# Patient Record
Sex: MALE | Race: WHITE | NOT HISPANIC OR LATINO | URBAN - METROPOLITAN AREA
[De-identification: names, ages, dates, MRNs, and addresses within clinical notes are randomized per-mention and may not be internally consistent; named-entity substitution may affect disease eponyms.]

---

## 2024-05-22 ENCOUNTER — NURSING HOME VISIT (OUTPATIENT)
Dept: POST ACUTE CARE | Facility: EXTERNAL LOCATION | Age: 29
End: 2024-05-22
Payer: MEDICARE

## 2024-05-22 VITALS
WEIGHT: 315 LBS | BODY MASS INDEX: 42.66 KG/M2 | HEART RATE: 90 BPM | SYSTOLIC BLOOD PRESSURE: 106 MMHG | TEMPERATURE: 97.3 F | OXYGEN SATURATION: 97 % | DIASTOLIC BLOOD PRESSURE: 70 MMHG | RESPIRATION RATE: 18 BRPM | HEIGHT: 72 IN

## 2024-05-22 DIAGNOSIS — J30.2 SEASONAL ALLERGIES: ICD-10-CM

## 2024-05-22 DIAGNOSIS — K21.9 GASTROESOPHAGEAL REFLUX DISEASE WITHOUT ESOPHAGITIS: ICD-10-CM

## 2024-05-22 DIAGNOSIS — F51.01 PRIMARY INSOMNIA: ICD-10-CM

## 2024-05-22 DIAGNOSIS — F84.0 AUTISM (HHS-HCC): ICD-10-CM

## 2024-05-22 DIAGNOSIS — E78.2 MIXED HYPERLIPIDEMIA: Primary | ICD-10-CM

## 2024-05-22 DIAGNOSIS — K59.04 CHRONIC IDIOPATHIC CONSTIPATION: ICD-10-CM

## 2024-05-22 DIAGNOSIS — Z11.4 SCREENING FOR HIV (HUMAN IMMUNODEFICIENCY VIRUS): ICD-10-CM

## 2024-05-22 DIAGNOSIS — G40.909 NONINTRACTABLE EPILEPSY WITHOUT STATUS EPILEPTICUS, UNSPECIFIED EPILEPSY TYPE (MULTI): ICD-10-CM

## 2024-05-22 DIAGNOSIS — Z11.59 ENCOUNTER FOR HEPATITIS C SCREENING TEST FOR LOW RISK PATIENT: ICD-10-CM

## 2024-05-22 DIAGNOSIS — E55.9 VITAMIN D DEFICIENCY: ICD-10-CM

## 2024-05-22 PROBLEM — K59.00 CONSTIPATION: Status: ACTIVE | Noted: 2024-05-22

## 2024-05-22 PROBLEM — H91.92 DECREASED HEARING OF LEFT EAR: Status: ACTIVE | Noted: 2024-05-22

## 2024-05-22 PROBLEM — R44.0 AUDITORY HALLUCINATION: Status: ACTIVE | Noted: 2024-05-22

## 2024-05-22 PROBLEM — G47.00 INSOMNIA: Status: ACTIVE | Noted: 2024-05-22

## 2024-05-22 LAB
25(OH)D3 SERPL-MCNC: 23 NG/ML (ref 31–100)
ALBUMIN SERPL-MCNC: 4.9 G/DL (ref 3.5–5)
ALP BLD-CCNC: 89 U/L (ref 35–125)
ALT SERPL-CCNC: 47 U/L (ref 5–40)
ANION GAP SERPL CALC-SCNC: 13 MMOL/L
AST SERPL-CCNC: 23 U/L (ref 5–40)
BILIRUB SERPL-MCNC: 0.4 MG/DL (ref 0.1–1.2)
BUN SERPL-MCNC: 14 MG/DL (ref 8–25)
CALCIUM SERPL-MCNC: 9.8 MG/DL (ref 8.5–10.4)
CHLORIDE SERPL-SCNC: 106 MMOL/L (ref 97–107)
CHOLEST SERPL-MCNC: 156 MG/DL (ref 133–200)
CHOLEST/HDLC SERPL: 4.1 {RATIO}
CO2 SERPL-SCNC: 19 MMOL/L (ref 24–31)
CREAT SERPL-MCNC: 1.2 MG/DL (ref 0.4–1.6)
EGFRCR SERPLBLD CKD-EPI 2021: 84 ML/MIN/1.73M*2
ERYTHROCYTE [DISTWIDTH] IN BLOOD BY AUTOMATED COUNT: 12.1 % (ref 11.5–14.5)
GLUCOSE SERPL-MCNC: 104 MG/DL (ref 65–99)
HCT VFR BLD AUTO: 46.7 % (ref 41–52)
HDLC SERPL-MCNC: 38 MG/DL
HGB BLD-MCNC: 15.9 G/DL (ref 13.5–17.5)
LDLC SERPL CALC-MCNC: 92 MG/DL (ref 65–130)
MCH RBC QN AUTO: 30.2 PG (ref 26–34)
MCHC RBC AUTO-ENTMCNC: 34 G/DL (ref 32–36)
MCV RBC AUTO: 89 FL (ref 80–100)
NRBC BLD-RTO: 0 /100 WBCS (ref 0–0)
PLATELET # BLD AUTO: 238 X10*3/UL (ref 150–450)
POTASSIUM SERPL-SCNC: 4.5 MMOL/L (ref 3.4–5.1)
PROT SERPL-MCNC: 7.6 G/DL (ref 5.9–7.9)
RBC # BLD AUTO: 5.26 X10*6/UL (ref 4.5–5.9)
SODIUM SERPL-SCNC: 138 MMOL/L (ref 133–145)
TRIGL SERPL-MCNC: 132 MG/DL (ref 40–150)
WBC # BLD AUTO: 5.8 X10*3/UL (ref 4.4–11.3)

## 2024-05-22 PROCEDURE — 36415 COLL VENOUS BLD VENIPUNCTURE: CPT

## 2024-05-22 PROCEDURE — 80061 LIPID PANEL: CPT

## 2024-05-22 PROCEDURE — 36415 COLL VENOUS BLD VENIPUNCTURE: CPT | Performed by: NURSE PRACTITIONER

## 2024-05-22 PROCEDURE — 85027 COMPLETE CBC AUTOMATED: CPT

## 2024-05-22 PROCEDURE — 82306 VITAMIN D 25 HYDROXY: CPT

## 2024-05-22 PROCEDURE — 80053 COMPREHEN METABOLIC PANEL: CPT

## 2024-05-22 PROCEDURE — 86803 HEPATITIS C AB TEST: CPT

## 2024-05-22 PROCEDURE — 99349 HOME/RES VST EST MOD MDM 40: CPT | Performed by: NURSE PRACTITIONER

## 2024-05-22 PROCEDURE — 87389 HIV-1 AG W/HIV-1&-2 AB AG IA: CPT

## 2024-05-22 RX ORDER — FLUOXETINE HYDROCHLORIDE 20 MG/1
60 CAPSULE ORAL DAILY
COMMUNITY
Start: 2024-05-15

## 2024-05-22 RX ORDER — DOCUSATE SODIUM 100 MG/1
100 CAPSULE ORAL 2 TIMES DAILY
COMMUNITY
Start: 2013-11-18

## 2024-05-22 RX ORDER — ZONISAMIDE 100 MG/1
300 CAPSULE ORAL 2 TIMES DAILY
COMMUNITY
Start: 2013-11-18

## 2024-05-22 RX ORDER — BLACK COHOSH ROOT 200 MG
1000 CAPSULE ORAL DAILY
COMMUNITY
Start: 2024-05-15

## 2024-05-22 RX ORDER — IBUPROFEN 600 MG/1
600 TABLET ORAL EVERY 6 HOURS PRN
COMMUNITY

## 2024-05-22 RX ORDER — TRAZODONE HYDROCHLORIDE 100 MG/1
100 TABLET ORAL NIGHTLY
COMMUNITY
Start: 2020-06-24

## 2024-05-22 RX ORDER — OMEPRAZOLE 40 MG/1
40 CAPSULE, DELAYED RELEASE ORAL DAILY
COMMUNITY
Start: 2024-05-15

## 2024-05-22 RX ORDER — CHOLECALCIFEROL (VITAMIN D3) 10 MCG
2 TABLET ORAL DAILY
COMMUNITY
Start: 2024-05-15

## 2024-05-22 RX ORDER — CETIRIZINE HCL 1 MG/ML
1 SOLUTION, ORAL ORAL DAILY
COMMUNITY
Start: 2020-02-21

## 2024-05-22 RX ORDER — EPINEPHRINE 0.3 MG/.3ML
0.3 INJECTION SUBCUTANEOUS ONCE AS NEEDED
COMMUNITY
Start: 2020-02-21

## 2024-05-22 ASSESSMENT — ENCOUNTER SYMPTOMS
LIGHT-HEADEDNESS: 0
CHILLS: 0
NERVOUS/ANXIOUS: 1
HEMATURIA: 0
PALPITATIONS: 0
COUGH: 0
SHORTNESS OF BREATH: 0
SLEEP DISTURBANCE: 1
ABDOMINAL PAIN: 0
TROUBLE SWALLOWING: 0
FEVER: 0
WHEEZING: 0
NAUSEA: 0
DIZZINESS: 0
DIARRHEA: 1
DYSURIA: 0
APPETITE CHANGE: 0
CONSTIPATION: 0
VOMITING: 0

## 2024-05-22 ASSESSMENT — PAIN SCALES - GENERAL: PAINLEVEL: 0-NO PAIN

## 2024-05-22 NOTE — PROGRESS NOTES
Subjective   Patient ID: Daniel Laughlin is a 28 y.o. male who presents for Establish Care (New patient to [facility]).    Visit for 27 y/o male seen today at [facility] to [facility] care. Patient was initially at [facility] upon provider arrival. He walked to the store from his group home. Staff reports that patient has adjusted well to his new facility. He was previously living on his own with caregivers but tells me that he was unable to find a reliable roommate and was no longer able to afford his own home so he decided to move into a group setting. He is alert, able to answer most questions regarding his health but does respond to some questions with one or two word answers or slow responses. His PCP is Dr. César corona and he was last seen in the office on 3/11/24. He received an order to have lab work done but has been unable to get the labs since his appointment. Patient denies any appetite changes or signs of weight loss. He denies abdominal pain but does endorse some intermittent indigestion depending on what he eats. He denies nausea, vomiting, bowel or bladder concerns. He endorses difficulty sleeping and chronic anxiety. He has history of seizures but denies any recent seizure activity. He is ambulatory without assistive device. He denies recent fall or injury. Denies any acute concerns today.          Current Outpatient Medications:     Colace 100 mg capsule, Take 1 capsule (100 mg) by mouth 2 times a day., Disp: , Rfl:     EPINEPHrine 0.3 mg/0.3 mL injection syringe, Inject 0.3 mL (0.3 mg) into the muscle 1 time if needed for anaphylaxis. As Directed, Disp: , Rfl:     FLUoxetine (PROzac) 20 mg capsule, Take 3 capsules (60 mg) by mouth once daily., Disp: , Rfl:     omeprazole (PriLOSEC) 40 mg DR capsule, Take 1 capsule (40 mg) by mouth once daily., Disp: , Rfl:     traZODone (Desyrel) 100 mg tablet, Take 1 tablet (100 mg) by mouth once daily at bedtime., Disp: ,  Rfl:     Vitamin C 500 mg tablet, Take 2 tablets (1,000 mg) by mouth once daily., Disp: , Rfl:     Vitamin D3 10 mcg (400 unit) tablet, Take 2 tablets (20 mcg) by mouth once daily., Disp: , Rfl:     zonisamide (Zonegran) 100 mg capsule, Take 3 capsules (300 mg) by mouth twice a day., Disp: , Rfl:     ZyrTEC 10 mg tablet, Take 1 tablet (10 mg) by mouth once daily., Disp: , Rfl:     ibuprofen 600 mg tablet, Take 1 tablet (600 mg) by mouth every 6 hours if needed for mild pain (1 - 3)., Disp: , Rfl:      Review of Systems   Constitutional:  Negative for appetite change, chills and fever.   HENT:  Positive for hearing loss. Negative for trouble swallowing.    Respiratory:  Negative for cough, shortness of breath and wheezing.    Cardiovascular:  Negative for chest pain and palpitations.   Gastrointestinal:  Positive for diarrhea. Negative for abdominal pain, constipation, nausea and vomiting.        Positive for intermittent indigestion   Genitourinary:  Negative for dysuria and hematuria.   Musculoskeletal:  Negative for gait problem.   Neurological:  Negative for dizziness and light-headedness.   Psychiatric/Behavioral:  Positive for sleep disturbance. The patient is nervous/anxious.      Objective   /70 (BP Location: Left arm, Patient Position: Sitting, BP Cuff Size: Adult)   Pulse 90   Temp 36.3 °C (97.3 °F) (Temporal)   Resp 18   Ht 1.829 m (6')   Wt 143 kg (316 lb)   SpO2 97%   BMI 42.86 kg/m²     Physical Exam  Constitutional:       General: He is not in acute distress.     Appearance: He is obese.   HENT:      Head: Normocephalic and atraumatic.      Nose: Nose normal.      Mouth/Throat:      Mouth: Mucous membranes are moist.      Pharynx: Oropharynx is clear.   Eyes:      Pupils: Pupils are equal, round, and reactive to light.   Cardiovascular:      Rate and Rhythm: Normal rate and regular rhythm.      Heart sounds: No murmur heard.     No friction rub. No gallop.   Pulmonary:      Effort:  Pulmonary effort is normal. No respiratory distress.      Breath sounds: Normal breath sounds. No wheezing, rhonchi or rales.   Abdominal:      General: Bowel sounds are normal. There is no distension.      Palpations: Abdomen is soft.      Tenderness: There is no abdominal tenderness.   Musculoskeletal:         General: Normal range of motion.      Cervical back: Neck supple.      Right lower leg: No edema.      Left lower leg: No edema.   Skin:     General: Skin is warm and dry.   Neurological:      General: No focal deficit present.      Mental Status: He is alert and oriented to person, place, and time.   Psychiatric:         Mood and Affect: Mood normal.         Behavior: Behavior normal.     Assessment/Plan   Diagnoses and all orders for this visit:  Mixed hyperlipidemia  -     Lipid panel; Future    Gastroesophageal reflux disease without esophagitis  -     CBC; Future  -     Comprehensive metabolic panel; Future  -chronic, intermittent  -continue omeprazole     Vitamin D deficiency  -     Vitamin D 25-Hydroxy,Total (for eval of Vitamin D levels); Future  -chronic, stable  -continue vitamin d/vitamin c supplement    Chronic idiopathic constipation  -chronic, intermittent  -continue routine colace     Nonintractable epilepsy without status epilepticus, unspecified epilepsy type (Multi)  -chronic, stable  -no recent seizure activity reported  -continue Zonisamide     Seasonal allergies  -chronic, managed with [facility]     Encounter for hepatitis C screening test for low risk patient  -     Hepatitis C antibody; Future    Screening for HIV (human immunodeficiency virus)  -     HIV 1/2 Antigen/Antibody Screen with Reflex to Confirmation; Future    Primary insomnia  -chronic, intermittent  -continue Trazodone    Autism ([facility])  -chronic, continue safety measures and supportive care    Routine labs obtained in home- CBC, CMP, Vitamin D, Hepatitis C, HIV, Lipid panel- pt tolerated well        Adenike Abad,  APRN-CNP

## 2024-05-23 LAB
HCV AB SER QL: NONREACTIVE
HIV 1+2 AB+HIV1 P24 AG SERPL QL IA: NONREACTIVE

## 2024-06-12 ENCOUNTER — TELEPHONE (OUTPATIENT)
Dept: PRIMARY CARE | Facility: CLINIC | Age: 29
End: 2024-06-12
Payer: MEDICARE

## 2024-06-12 NOTE — TELEPHONE ENCOUNTER
Patient will need paperwork completed to attend workshop.  Fax number given to Elisabeth who states she will fax documents to the office for provider completion.

## 2024-07-03 ENCOUNTER — TELEPHONE (OUTPATIENT)
Dept: PRIMARY CARE | Facility: CLINIC | Age: 29
End: 2024-07-03
Payer: MEDICARE

## 2024-07-03 NOTE — TELEPHONE ENCOUNTER
7/5/24 House Calls visit with Adenike Abad NP confirmed via phone with Elisabeth/ [facility] .

## 2024-07-05 ENCOUNTER — NURSING HOME VISIT (OUTPATIENT)
Dept: POST ACUTE CARE | Facility: EXTERNAL LOCATION | Age: 29
End: 2024-07-05
Payer: MEDICARE

## 2024-07-05 VITALS
HEART RATE: 83 BPM | RESPIRATION RATE: 18 BRPM | TEMPERATURE: 96.9 F | OXYGEN SATURATION: 97 % | HEIGHT: 72 IN | SYSTOLIC BLOOD PRESSURE: 110 MMHG | DIASTOLIC BLOOD PRESSURE: 70 MMHG | WEIGHT: 315 LBS | BODY MASS INDEX: 42.66 KG/M2

## 2024-07-05 DIAGNOSIS — K59.04 CHRONIC IDIOPATHIC CONSTIPATION: ICD-10-CM

## 2024-07-05 DIAGNOSIS — F84.0 AUTISM (HHS-HCC): Primary | ICD-10-CM

## 2024-07-05 DIAGNOSIS — G40.909 NONINTRACTABLE EPILEPSY WITHOUT STATUS EPILEPTICUS, UNSPECIFIED EPILEPSY TYPE (MULTI): ICD-10-CM

## 2024-07-05 DIAGNOSIS — F51.05 INSOMNIA DUE TO OTHER MENTAL DISORDER: ICD-10-CM

## 2024-07-05 DIAGNOSIS — K21.9 GASTROESOPHAGEAL REFLUX DISEASE WITHOUT ESOPHAGITIS: ICD-10-CM

## 2024-07-05 DIAGNOSIS — F99 INSOMNIA DUE TO OTHER MENTAL DISORDER: ICD-10-CM

## 2024-07-05 DIAGNOSIS — E55.9 VITAMIN D DEFICIENCY: ICD-10-CM

## 2024-07-05 ASSESSMENT — PAIN SCALES - GENERAL: PAINLEVEL: 0-NO PAIN

## 2024-07-05 NOTE — PROGRESS NOTES
Subjective   Patient ID: Daniel Laughlin is a 28 y.o. male who presents for Follow-up (Routine follow up, chronic medical conditions).    Visit for 29 y/o male seen today at [facility] for routine follow up of chronic medical conditions. PMHx of Autism, Epilepsy, GERD, Hyperlipidemia, Vitamin D deficiency, seasonal allergies, decreased hearing of left ear, constipation, hallucinations, insomnia. Pt is standing in the kitchen this morning. He is alert, able to answer most questions regarding his health but does respond to some questions with one or two word answers or slow responses. The HPI is supplemented by facility staff and patients medical chart. Facility staff reports that patient is adjusting to new facility well. He is going to day program M-F now and is doing well. No behavioral concerns reported. Pt denies appetite changes or signs of weight loss. Denies abdominal pain, indigestion, nausea, vomiting. Denies bowel or bladder concerns. He has history of epilepsy. No recent seizure activity reported. Pt is ambulatory. Does not use assistive device. He denies recent fall or injury. He is able to walk to the local [employer] but reports that he has not been going as often as it has been hot outside and he does not have a phone to use when leaving the facility.          Current Outpatient Medications:     Colace 100 mg capsule, Take 1 capsule (100 mg) by mouth 2 times a day., Disp: , Rfl:     EPINEPHrine 0.3 mg/0.3 mL injection syringe, Inject 0.3 mL (0.3 mg) into the muscle 1 time if needed for anaphylaxis. As Directed, Disp: , Rfl:     FLUoxetine (PROzac) 20 mg capsule, Take 3 capsules (60 mg) by mouth once daily., Disp: , Rfl:     ibuprofen 600 mg tablet, Take 1 tablet (600 mg) by mouth every 6 hours if needed for mild pain (1 - 3)., Disp: , Rfl:     omeprazole (PriLOSEC) 40 mg DR capsule, Take 1 capsule (40 mg) by mouth once daily., Disp: , Rfl:     traZODone (Desyrel) 100 mg  tablet, Take 1 tablet (100 mg) by mouth once daily at bedtime., Disp: , Rfl:     Vitamin C 500 mg tablet, Take 2 tablets (1,000 mg) by mouth once daily., Disp: , Rfl:     Vitamin D3 10 mcg (400 unit) tablet, Take 2 tablets (20 mcg) by mouth once daily., Disp: , Rfl:     zonisamide (Zonegran) 100 mg capsule, Take 3 capsules (300 mg) by mouth twice a day., Disp: , Rfl:     ZyrTEC 10 mg tablet, Take 1 tablet (10 mg) by mouth once daily., Disp: , Rfl:      Review of Systems  Constitutional:  Negative for appetite change, chills and fever.   HENT:  Positive for hearing loss. Negative for trouble swallowing.    Respiratory:  Negative for cough, shortness of breath and wheezing.    Cardiovascular:  Negative for chest pain and palpitations.   Gastrointestinal: Negative for abdominal pain, constipation, nausea and vomiting.   Genitourinary:  Negative for dysuria and hematuria.   Musculoskeletal:  Negative for gait problem.   Neurological:  Negative for dizziness and light-headedness.   Psychiatric/Behavioral:  Positive for anxiety, difficulty.    Objective   /70 (BP Location: Left arm, Patient Position: Standing, BP Cuff Size: Adult)   Pulse 83   Temp 36.1 °C (96.9 °F) (Temporal)   Resp 18   Ht 1.829 m (6')   Wt 143 kg (315 lb)   SpO2 97%   BMI 42.72 kg/m²     Physical Exam  Constitutional:       General: He is not in acute distress.     Appearance: Alert. He is obese.   HENT:      Head: Normocephalic and atraumatic.      Nose: Nose normal.      Mouth/Throat:      Mouth: Mucous membranes are moist.      Pharynx: Oropharynx is clear.   Eyes:      Pupils: Pupils are equal, round, and reactive to light.   Cardiovascular:      Rate and Rhythm: Normal rate and regular rhythm.      Heart sounds: No murmur heard.     No friction rub. No gallop.   Pulmonary:      Effort: Pulmonary effort is normal. No respiratory distress.      Breath sounds: Normal breath sounds. No wheezing, rhonchi or rales.   Abdominal:       "General: Bowel sounds are normal. There is no distension.      Palpations: Abdomen is soft.      Tenderness: There is no abdominal tenderness.   Musculoskeletal:         General: Normal range of motion.      Cervical back: Neck supple.      Right lower leg: No edema.      Left lower leg: No edema.   Skin:     General: Skin is warm and dry.   Neurological:      General: No focal deficit present.      Mental Status: He is alert and oriented to person, place, and time.   Psychiatric:         Mood and Affect: Mood normal.         Behavior: Behavior normal.     Lab Results   Component Value Date    WBC 5.8 05/22/2024    HGB 15.9 05/22/2024    HCT 46.7 05/22/2024    MCV 89 05/22/2024     05/22/2024       Chemistry    Lab Results   Component Value Date/Time     05/22/2024 1405    K 4.5 05/22/2024 1405     05/22/2024 1405    CO2 19 (L) 05/22/2024 1405    BUN 14 05/22/2024 1405    CREATININE 1.20 05/22/2024 1405    Lab Results   Component Value Date/Time    CALCIUM 9.8 05/22/2024 1405    ALKPHOS 89 05/22/2024 1405    AST 23 05/22/2024 1405    ALT 47 (H) 05/22/2024 1405    BILITOT 0.4 05/22/2024 1405        Lab Results   Component Value Date    CHOL 156 05/22/2024    CHOL 131 02/29/2020     Lab Results   Component Value Date    HDL 38.0 (L) 05/22/2024    HDL 37.8 (A) 02/29/2020     Lab Results   Component Value Date    LDLCALC 92 05/22/2024     Lab Results   Component Value Date    TRIG 132 05/22/2024    TRIG 89 02/29/2020     No components found for: \"CHOLHDL\"     Vitamin D level: 23     Assessment/Plan   Diagnoses and all orders for this visit:  Autism ([facility])  -chronic, resides in group home  -continue safety measures and supportive care    Nonintractable epilepsy without status epilepticus, unspecified epilepsy type (Multi)  -chronic, stable  -no recent seizure activity reported  -continue Zonisamide     Gastroesophageal reflux disease without esophagitis  -chronic, stable  -no current GI " concerns  -continue Omeprazole     Chronic idiopathic constipation  -chronic, stable  -no current bowel concerns  -continue routine colace     Vitamin D deficiency  -chronic, last vitamin d level 23  -continue vitamin D/Vitamin C supplementation    Insomnia due to other mental disorder  -chronic, stable  -continue Trazodone    Patient stable. Facility staff to contact house calls office with any acute concerns or medication needs.        Adenike Abad, APRN-CNP

## 2024-08-28 ENCOUNTER — TELEPHONE (OUTPATIENT)
Dept: PRIMARY CARE | Facility: CLINIC | Age: 29
End: 2024-08-28
Payer: MEDICARE

## 2024-08-29 ENCOUNTER — NURSING HOME VISIT (OUTPATIENT)
Dept: POST ACUTE CARE | Facility: EXTERNAL LOCATION | Age: 29
End: 2024-08-29
Payer: MEDICARE

## 2024-08-29 VITALS
RESPIRATION RATE: 16 BRPM | OXYGEN SATURATION: 98 % | SYSTOLIC BLOOD PRESSURE: 128 MMHG | BODY MASS INDEX: 42.72 KG/M2 | TEMPERATURE: 98 F | HEART RATE: 78 BPM | HEIGHT: 72 IN | DIASTOLIC BLOOD PRESSURE: 80 MMHG

## 2024-08-29 DIAGNOSIS — G40.909 NONINTRACTABLE EPILEPSY WITHOUT STATUS EPILEPTICUS, UNSPECIFIED EPILEPSY TYPE (MULTI): Primary | ICD-10-CM

## 2024-08-29 DIAGNOSIS — K59.04 CHRONIC IDIOPATHIC CONSTIPATION: ICD-10-CM

## 2024-08-29 DIAGNOSIS — F84.0 AUTISM (HHS-HCC): ICD-10-CM

## 2024-08-29 DIAGNOSIS — K21.9 GASTROESOPHAGEAL REFLUX DISEASE WITHOUT ESOPHAGITIS: ICD-10-CM

## 2024-08-29 DIAGNOSIS — F51.05 INSOMNIA DUE TO OTHER MENTAL DISORDER: ICD-10-CM

## 2024-08-29 DIAGNOSIS — F99 INSOMNIA DUE TO OTHER MENTAL DISORDER: ICD-10-CM

## 2024-08-29 PROCEDURE — 99349 HOME/RES VST EST MOD MDM 40: CPT | Performed by: NURSE PRACTITIONER

## 2024-08-29 ASSESSMENT — PAIN SCALES - GENERAL: PAINLEVEL: 0-NO PAIN

## 2024-08-29 NOTE — PROGRESS NOTES
"Subjective   Patient ID: Daniel Laughlin is a 29 y.o. male who presents for Follow-up (Routine 6 week follow up).    Visit for 28 y/o male seen today at [facility], accompanied by facility caregiver Saima for routine follow up. Pt is standing in kitchen this afternoon. PMHx of Autism, Epilepsy, GERD, Hyperlipidemia, Vitamin D deficiency, seasonal allergies, decreased hearing of left ear, constipation, hallucinations, insomnia. Pt is alert, able to answer most questions regarding his health but does respond to some questions with one or two word answers or slow responses and will often times stutter and need questions repeated. The health history is supplemented by facility staff and patients medical chart. Facility staff reports that patient spends a lot of time in his bedroom. He likes to play on his tablet. He was previously able to [facility] the [facility] but has been unable as he does not having a working cell phone right now. Pt attends a day program Monday thru Friday. There have been no reported issues while out of the home. Facility staff manages patients medications and does all meal preparation. Pt denies appetite changes or signs of weight loss. He reports feeling off balance, mostly due to being \"overweight\". He denies abdominal pain, indigestion, nausea, vomiting. Pt endorses occasional loose bowel movements but denies any current bowel issues today. Pt denies bladder concerns. Denies recent fall or injury. He has history of epilepsy. No recent seizure activity reported.          Current Outpatient Medications:     Colace 100 mg capsule, Take 1 capsule (100 mg) by mouth 2 times a day., Disp: , Rfl:     EPINEPHrine 0.3 mg/0.3 mL injection syringe, Inject 0.3 mL (0.3 mg) into the muscle 1 time if needed for anaphylaxis. As Directed, Disp: , Rfl:     FLUoxetine (PROzac) 20 mg capsule, Take 3 capsules (60 mg) by mouth once daily., Disp: , Rfl:     ibuprofen 600 mg tablet, " Take 1 tablet (600 mg) by mouth every 6 hours if needed for mild pain (1 - 3)., Disp: , Rfl:     omeprazole (PriLOSEC) 40 mg DR capsule, Take 1 capsule (40 mg) by mouth once daily., Disp: , Rfl:     traZODone (Desyrel) 100 mg tablet, Take 1 tablet (100 mg) by mouth once daily at bedtime., Disp: , Rfl:     Vitamin C 500 mg tablet, Take 2 tablets (1,000 mg) by mouth once daily., Disp: , Rfl:     Vitamin D3 10 mcg (400 unit) tablet, Take 2 tablets (20 mcg) by mouth once daily., Disp: , Rfl:     zonisamide (Zonegran) 100 mg capsule, Take 3 capsules (300 mg) by mouth twice a day., Disp: , Rfl:     ZyrTEC 10 mg tablet, Take 1 tablet (10 mg) by mouth once daily., Disp: , Rfl:      Review of Systems  Constitutional:  Negative for appetite change, chills and fever, unexplained weight loss.   HENT:  Positive for hearing loss. Negative for trouble swallowing.    Respiratory:  Negative for cough, shortness of breath and wheezing.    Cardiovascular:  Negative for chest pain and palpitations.   Gastrointestinal: Positive for occasional diarrhea. Negative for abdominal pain, constipation, nausea and vomiting.   Genitourinary:  Negative for dysuria and hematuria.   Musculoskeletal:  Negative for gait problem.   Neurological:  Negative for dizziness and light-headedness.   Psychiatric/Behavioral:  Positive for anxiety, difficulty.    Objective   /80 (BP Location: Left arm, Patient Position: Standing, BP Cuff Size: Adult)   Pulse 78   Temp 36.7 °C (98 °F) (Temporal)   Resp 16   Ht 1.829 m (6')   SpO2 98%   BMI 42.72 kg/m²     Physical Exam  Constitutional:       General: He is not in acute distress.     Appearance: Alert. He is obese. Standing for exam.    HENT:      Head: Normocephalic and atraumatic.      Nose: Nose normal.      Mouth/Throat:      Mouth: Mucous membranes are moist.      Pharynx: Oropharynx is clear.   Eyes:      Pupils: Pupils are equal, round, and reactive to light.   Cardiovascular:      Rate and  Rhythm: Normal rate and regular rhythm.      Heart sounds: No murmur heard.     No friction rub. No gallop.   Pulmonary:      Effort: Pulmonary effort is normal. No respiratory distress.      Breath sounds: Normal breath sounds. No wheezing, rhonchi or rales.   Abdominal:      General: Bowel sounds are normal. There is no distension.      Palpations: Abdomen is soft.      Tenderness: There is no abdominal tenderness.   Musculoskeletal:         General: Normal range of motion.      Cervical back: Neck supple.      Right lower leg: No edema.      Left lower leg: No edema.   Skin:     General: Skin is warm and dry.   Neurological:      General: No focal deficit present.      Mental Status: He is alert and oriented to person, place, and time.   Psychiatric:         Mood and Affect: Mood normal.         Behavior: Behavior normal.     Assessment/Plan   Diagnoses and all orders for this visit:  Nonintractable epilepsy without status epilepticus, unspecified epilepsy type (Multi)  -chronic, stable, no recent seizure activity reported  -continue Zonisamide   -recommend annual follow up with neurology     Autism ([facility])  -chronic, resides in group home  -continue safety measures and supportive care    Gastroesophageal reflux disease without esophagitis  -chronic, stable, no current GI concerns   -continue Omeprazole     Chronic idiopathic constipation  -chronic, history of, managed with routine stool softeners   -pt reports occasional loose Bms, denies any concerns today     Insomnia due to other mental disorder  -chronic, stable, continue current dose of Trazodone        CODY Daigle-CNP

## 2024-08-29 NOTE — LETTER
"Patient: Daniel Laughlin  : 1995    Encounter Date: 2024    Subjective  Patient ID: Daniel Laughlin is a 29 y.o. male who presents for Follow-up (Routine 6 week follow up).    Visit for 30 y/o male seen today at [facility], accompanied by facility caregiver Saima for routine follow up. Pt is standing in kitchen this afternoon. PMHx of Autism, Epilepsy, GERD, Hyperlipidemia, Vitamin D deficiency, seasonal allergies, decreased hearing of left ear, constipation, hallucinations, insomnia. Pt is alert, able to answer most questions regarding his health but does respond to some questions with one or two word answers or slow responses and will often times stutter and need questions repeated. The health history is supplemented by facility staff and patients medical chart. Facility staff reports that patient spends a lot of time in his bedroom. He likes to play on his tablet. He was previously able to walk to the local [facility] but has been unable as he does not having a working cell phone right now. Pt attends a day program Monday thru Friday. There have been no reported issues while out of the home. Facility staff manages patients medications and does all meal preparation. Pt denies appetite changes or signs of weight loss. He reports feeling off balance, mostly due to being \"overweight\". He denies abdominal pain, indigestion, nausea, vomiting. Pt endorses occasional loose bowel movements but denies any current bowel issues today. Pat denies bladder concerns. Denies recent fall or injury. He has history of epilepsy. No recent seizure activity reported.          Current Outpatient Medications:   •  Colace 100 mg capsule, Take 1 capsule (100 mg) by mouth 2 times a day., Disp: , Rfl:   •  EPINEPHrine 0.3 mg/0.3 mL injection syringe, Inject 0.3 mL (0.3 mg) into the muscle 1 time if needed for anaphylaxis. As Directed, Disp: , Rfl:   •  FLUoxetine (PROzac) 20 mg capsule, Take 3 " capsules (60 mg) by mouth once daily., Disp: , Rfl:   •  ibuprofen 600 mg tablet, Take 1 tablet (600 mg) by mouth every 6 hours if needed for mild pain (1 - 3)., Disp: , Rfl:   •  omeprazole (PriLOSEC) 40 mg DR capsule, Take 1 capsule (40 mg) by mouth once daily., Disp: , Rfl:   •  traZODone (Desyrel) 100 mg tablet, Take 1 tablet (100 mg) by mouth once daily at bedtime., Disp: , Rfl:   •  Vitamin C 500 mg tablet, Take 2 tablets (1,000 mg) by mouth once daily., Disp: , Rfl:   •  Vitamin D3 10 mcg (400 unit) tablet, Take 2 tablets (20 mcg) by mouth once daily., Disp: , Rfl:   •  zonisamide (Zonegran) 100 mg capsule, Take 3 capsules (300 mg) by mouth twice a day., Disp: , Rfl:   •  ZyrTEC 10 mg tablet, Take 1 tablet (10 mg) by mouth once daily., Disp: , Rfl:      Review of Systems  Constitutional:  Negative for appetite change, chills and fever, unexplained weight loss.   HENT:  Positive for hearing loss. Negative for trouble swallowing.    Respiratory:  Negative for cough, shortness of breath and wheezing.    Cardiovascular:  Negative for chest pain and palpitations.   Gastrointestinal: Positive for occasional diarrhea. Negative for abdominal pain, constipation, nausea and vomiting.   Genitourinary:  Negative for dysuria and hematuria.   Musculoskeletal:  Negative for gait problem.   Neurological:  Negative for dizziness and light-headedness.   Psychiatric/Behavioral:  Positive for anxiety, difficulty.    Objective  /80 (BP Location: Left arm, Patient Position: Standing, BP Cuff Size: Adult)   Pulse 78   Temp 36.7 °C (98 °F) (Temporal)   Resp 16   Ht 1.829 m (6')   SpO2 98%   BMI 42.72 kg/m²     Physical Exam  Constitutional:       General: He is not in acute distress.     Appearance: Alert. He is obese. Standing for exam.    HENT:      Head: Normocephalic and atraumatic.      Nose: Nose normal.      Mouth/Throat:      Mouth: Mucous membranes are moist.      Pharynx: Oropharynx is clear.   Eyes:       Pupils: Pupils are equal, round, and reactive to light.   Cardiovascular:      Rate and Rhythm: Normal rate and regular rhythm.      Heart sounds: No murmur heard.     No friction rub. No gallop.   Pulmonary:      Effort: Pulmonary effort is normal. No respiratory distress.      Breath sounds: Normal breath sounds. No wheezing, rhonchi or rales.   Abdominal:      General: Bowel sounds are normal. There is no distension.      Palpations: Abdomen is soft.      Tenderness: There is no abdominal tenderness.   Musculoskeletal:         General: Normal range of motion.      Cervical back: Neck supple.      Right lower leg: No edema.      Left lower leg: No edema.   Skin:     General: Skin is warm and dry.   Neurological:      General: No focal deficit present.      Mental Status: He is alert and oriented to person, place, and time.   Psychiatric:         Mood and Affect: Mood normal.         Behavior: Behavior normal.     Assessment/Plan  {Assess/PlanSmartLinks:70417}             ROMI Daigle       Electronically Signed By: ROMI Daigle   8/30/24  9:26 AM

## 2024-09-04 PROBLEM — F84.0 AUTISM (HHS-HCC): Status: ACTIVE | Noted: 2024-09-04

## 2024-10-02 ENCOUNTER — TELEPHONE (OUTPATIENT)
Dept: PRIMARY CARE | Facility: CLINIC | Age: 29
End: 2024-10-02
Payer: MEDICARE

## 2024-10-02 NOTE — TELEPHONE ENCOUNTER
10/3/24 House Calls visit with Adenike Abad NP confirmed via phone with Elisabeth Sanz/ [facility] . Elisabeth notified per provider's request to keep patient home from day program.

## 2024-10-03 ENCOUNTER — HOME HEALTH ADMISSION (OUTPATIENT)
Dept: HOME HEALTH SERVICES | Facility: HOME HEALTH | Age: 29
End: 2024-10-03
Payer: MEDICARE

## 2024-10-03 ENCOUNTER — DOCUMENTATION (OUTPATIENT)
Dept: HOME HEALTH SERVICES | Facility: HOME HEALTH | Age: 29
End: 2024-10-03

## 2024-10-03 ENCOUNTER — NURSING HOME VISIT (OUTPATIENT)
Dept: POST ACUTE CARE | Facility: EXTERNAL LOCATION | Age: 29
End: 2024-10-03
Payer: MEDICARE

## 2024-10-03 ENCOUNTER — TELEPHONE (OUTPATIENT)
Dept: HOME HEALTH SERVICES | Facility: HOME HEALTH | Age: 29
End: 2024-10-03

## 2024-10-03 VITALS
WEIGHT: 315 LBS | HEIGHT: 72 IN | HEART RATE: 67 BPM | RESPIRATION RATE: 16 BRPM | DIASTOLIC BLOOD PRESSURE: 60 MMHG | SYSTOLIC BLOOD PRESSURE: 116 MMHG | TEMPERATURE: 97.1 F | OXYGEN SATURATION: 99 % | BODY MASS INDEX: 42.66 KG/M2

## 2024-10-03 DIAGNOSIS — F84.0 AUTISM (HHS-HCC): ICD-10-CM

## 2024-10-03 DIAGNOSIS — F51.05 INSOMNIA DUE TO OTHER MENTAL DISORDER (CODE): ICD-10-CM

## 2024-10-03 DIAGNOSIS — G40.909 NONINTRACTABLE EPILEPSY WITHOUT STATUS EPILEPTICUS, UNSPECIFIED EPILEPSY TYPE (MULTI): ICD-10-CM

## 2024-10-03 DIAGNOSIS — R26.89 DIFFICULTY BALANCING WHEN BENDING: ICD-10-CM

## 2024-10-03 DIAGNOSIS — K21.9 GASTROESOPHAGEAL REFLUX DISEASE WITHOUT ESOPHAGITIS: Primary | ICD-10-CM

## 2024-10-03 DIAGNOSIS — K59.04 CHRONIC IDIOPATHIC CONSTIPATION: ICD-10-CM

## 2024-10-03 PROCEDURE — 99349 HOME/RES VST EST MOD MDM 40: CPT | Performed by: NURSE PRACTITIONER

## 2024-10-03 RX ORDER — OMEPRAZOLE 40 MG/1
40 CAPSULE, DELAYED RELEASE ORAL
Qty: 30 CAPSULE | Refills: 11 | Status: SHIPPED | OUTPATIENT
Start: 2024-10-03

## 2024-10-03 ASSESSMENT — PAIN SCALES - GENERAL: PAINLEVEL: 0-NO PAIN

## 2024-10-03 NOTE — LETTER
Patient: Daniel Laughlin  : 1995    Encounter Date: 10/03/2024    Subjective  Patient ID: Daniel Laughlin is a 29 y.o. male who presents for Follow-up (Routine follow up, chronic medical conditions).    Visit for 28 y/o male seen today at [facility], accompanied by  Elisabeth for routine follow up of chronic medical conditions. PMHx of Autism, Epilepsy, GERD, Hyperlipidemia, Vitamin D deficiency, seasonal allergies, decreased hearing of left ear, constipation, hallucinations, insomnia. Pt is sitting on couch this morning watching TV. He is alert, able to answer most questions regarding his health. Pt is slow to respond at times and will often stutter or answer with one or two word responses so facility staff does supplement the history as needed. Pt lives in group home and requires supervision due to medical history. He does go to a day program Monday through Friday and otherwise spends a lot of time in his room. He likes to play on his tablet. Facility staff manages patients medications and does all meal preparation. There are no reported compliance issues. Pt denies appetite changes or signs of weight loss. Denies abdominal pain, indigestion, nausea, vomiting. He has history of gastroenteritis, GERD and is prescribed Omeprazole. Facility has been giving medication twice daily per MAR. He has not had any recent GI issues. Pt denies bowel or bladder concerns. He remains ambulatory but does admit that he has balance difficulty. He reports feeling off balance most of the time and believes it is because of his weight but pt requests to work with therapy services as he is worried he will fall. Pt has history of epilepsy. No recent seizure activity reported.         Current Outpatient Medications:   •  Colace 100 mg capsule, Take 1 capsule (100 mg) by mouth 2 times a day., Disp: , Rfl:   •  EPINEPHrine 0.3 mg/0.3 mL injection syringe, Inject 0.3 mL (0.3 mg) into the  muscle 1 time if needed for anaphylaxis. As Directed, Disp: , Rfl:   •  FLUoxetine (PROzac) 20 mg capsule, Take 3 capsules (60 mg) by mouth once daily., Disp: , Rfl:   •  ibuprofen 600 mg tablet, Take 1 tablet (600 mg) by mouth every 6 hours if needed for mild pain (1 - 3)., Disp: , Rfl:   •  omeprazole (PriLOSEC) 40 mg DR capsule, Take 1 capsule (40 mg) by mouth once daily in the morning. Take before meals. Do not crush or chew., Disp: 30 capsule, Rfl: 11  •  traZODone (Desyrel) 100 mg tablet, Take 1 tablet (100 mg) by mouth once daily at bedtime., Disp: , Rfl:   •  Vitamin C 500 mg tablet, Take 2 tablets (1,000 mg) by mouth once daily., Disp: , Rfl:   •  Vitamin D3 10 mcg (400 unit) tablet, Take 2 tablets (20 mcg) by mouth once daily., Disp: , Rfl:   •  zonisamide (Zonegran) 100 mg capsule, Take 3 capsules (300 mg) by mouth twice a day., Disp: , Rfl:   •  ZyrTEC 10 mg tablet, Take 1 tablet (10 mg) by mouth once daily., Disp: , Rfl:      Review of Systems  Constitutional:  Negative for appetite change, chills and fever, unexplained weight loss.   HENT:  Positive for hearing loss. Negative for trouble swallowing.    Respiratory:  Negative for cough, shortness of breath and wheezing.    Cardiovascular:  Negative for chest pain and palpitations.   Gastrointestinal: Negative for abdominal pain, constipation, diarrhea, nausea and vomiting.   Genitourinary:  Negative for dysuria and hematuria.   Musculoskeletal:  Negative for pain, falls.   Neurological:  Positive for balance difficulty. Negative for dizziness and light-headedness.   Psychiatric/Behavioral:  Positive for anxiety.     Objective  /60 (BP Location: Right arm, Patient Position: Sitting, BP Cuff Size: Adult)   Pulse 67   Temp 36.2 °C (97.1 °F) (Temporal)   Resp 16   Ht 1.829 m (6')   Wt 143 kg (315 lb)   SpO2 99%   BMI 42.72 kg/m²     Physical Exam  Constitutional:       General: He is not in acute distress.     Appearance: Alert. He is obese.  Seen sitting on couch.   HENT:      Head: Normocephalic and atraumatic.      Nose: Nose normal.      Mouth/Throat:      Mouth: Mucous membranes are moist.      Pharynx: Oropharynx is clear.   Eyes:      Pupils: Pupils are equal, round, and reactive to light.   Cardiovascular:      Rate and Rhythm: Normal rate and regular rhythm.      Heart sounds: No murmur heard.     No friction rub. No gallop.   Pulmonary:      Effort: Pulmonary effort is normal. No respiratory distress.      Breath sounds: Normal breath sounds. No wheezing, rhonchi or rales.   Abdominal:      General: Bowel sounds are normal. There is no distension.      Palpations: Abdomen is soft.      Tenderness: There is no abdominal tenderness.   Musculoskeletal:         General: Normal range of motion.      Cervical back: Neck supple.      Right lower leg: No edema.      Left lower leg: No edema.   Skin:     General: Skin is warm and dry.   Neurological:      General: No focal deficit present.      Mental Status: He is alert and oriented to person, place, and time.   Psychiatric:         Mood and Affect: Mood normal.         Behavior: Behavior normal.      Assessment/Plan  Diagnoses and all orders for this visit:  Gastroesophageal reflux disease without esophagitis  -     omeprazole (PriLOSEC) 40 mg DR capsule; Take 1 capsule (40 mg) by mouth once daily in the morning. Take before meals. Do not crush or chew.  -chronic, stable, no current GI concerns, will decrease Omeprazole to once daily     [facility]ism ([facility])  -chronic, resides in group home  -continue safety measures and supportive care     Nonintractable epilepsy without status epilepticus, unspecified epilepsy type (Multi)  -chronic, stable, no recent seizure activity reported  -continue Zonisamide   -follow up with neurology annually      Chronic idiopathic constipation  -chronic, stable at this time, no current bowel concerns   -continue routine Colace     Insomnia due to other mental disorder  (CODE)  -chronic, well managed with Trazodone     Difficulty balancing when bending  -     Referral to Home Care; Future  -chronic, endorses difficulty with balance/ambulation  -will refer to [facility] for therapy evaluation     Patient stable. Will plan to update routine labs at next follow up.        ROMI Daigle     Electronically Signed By: ROMI Daigle   10/3/24 11:42 AM

## 2024-10-03 NOTE — HH CARE COORDINATION
Home Care received a Referral for Physical Therapy and Occupational Therapy. We have processed the referral for a Start of Care on 10/4-10/7.     If you have any questions or concerns, please feel free to contact us at [phone]. Follow the prompts, enter your five digit zip code, and you will be directed to your care team on E[facility] 1.

## 2024-10-03 NOTE — PROGRESS NOTES
Subjective   Patient ID: Daniel Laughlin is a 29 y.o. male who presents for Follow-up (Routine follow up, chronic medical conditions).    Visit for 30 y/o male seen today at [facility], accompanied by  Elisabeth for routine follow up of chronic medical conditions. PMHx of Autism, Epilepsy, GERD, Hyperlipidemia, Vitamin D deficiency, seasonal allergies, decreased hearing of left ear, constipation, hallucinations, insomnia. Pt is sitting on couch this morning watching TV. He is alert, able to answer most questions regarding his health. Pt is slow to respond at times and will often stutter or answer with one or two word responses so facility staff does supplement the history as needed. Pt lives in group home and requires supervision due to medical history. He does go to a day program Monday through Friday and otherwise spends a lot of time in his room. He likes to play on his tablet. Facility staff manages patients medications and does all meal preparation. There are no reported compliance issues. Pt denies appetite changes or signs of weight loss. Denies abdominal pain, indigestion, nausea, vomiting. He has history of gastroenteritis, GERD and is prescribed Omeprazole. Facility has been giving medication twice daily per MAR. He has not had any recent GI issues. Pt denies bowel or bladder concerns. He remains ambulatory but does admit that he has balance difficulty. He reports feeling off balance most of the time and believes it is because of his weight but pt requests to work with therapy services as he is worried he will fall. Pt has history of epilepsy. No recent seizure activity reported.         Current Outpatient Medications:     Colace 100 mg capsule, Take 1 capsule (100 mg) by mouth 2 times a day., Disp: , Rfl:     EPINEPHrine 0.3 mg/0.3 mL injection syringe, Inject 0.3 mL (0.3 mg) into the muscle 1 time if needed for anaphylaxis. As Directed, Disp: , Rfl:     FLUoxetine  (PROzac) 20 mg capsule, Take 3 capsules (60 mg) by mouth once daily., Disp: , Rfl:     ibuprofen 600 mg tablet, Take 1 tablet (600 mg) by mouth every 6 hours if needed for mild pain (1 - 3)., Disp: , Rfl:     omeprazole (PriLOSEC) 40 mg DR capsule, Take 1 capsule (40 mg) by mouth once daily in the morning. Take before meals. Do not crush or chew., Disp: 30 capsule, Rfl: 11    traZODone (Desyrel) 100 mg tablet, Take 1 tablet (100 mg) by mouth once daily at bedtime., Disp: , Rfl:     Vitamin C 500 mg tablet, Take 2 tablets (1,000 mg) by mouth once daily., Disp: , Rfl:     Vitamin D3 10 mcg (400 unit) tablet, Take 2 tablets (20 mcg) by mouth once daily., Disp: , Rfl:     zonisamide (Zonegran) 100 mg capsule, Take 3 capsules (300 mg) by mouth twice a day., Disp: , Rfl:     ZyrTEC 10 mg tablet, Take 1 tablet (10 mg) by mouth once daily., Disp: , Rfl:      Review of Systems  Constitutional:  Negative for appetite change, chills and fever, unexplained weight loss.   HENT:  Positive for hearing loss. Negative for trouble swallowing.    Respiratory:  Negative for cough, shortness of breath and wheezing.    Cardiovascular:  Negative for chest pain and palpitations.   Gastrointestinal: Negative for abdominal pain, constipation, diarrhea, nausea and vomiting.   Genitourinary:  Negative for dysuria and hematuria.   Musculoskeletal:  Negative for pain, falls.   Neurological:  Positive for balance difficulty. Negative for dizziness and light-headedness.   Psychiatric/Behavioral:  Positive for anxiety.     Objective   /60 (BP Location: Right arm, Patient Position: Sitting, BP Cuff Size: Adult)   Pulse 67   Temp 36.2 °C (97.1 °F) (Temporal)   Resp 16   Ht 1.829 m (6')   Wt 143 kg (315 lb)   SpO2 99%   BMI 42.72 kg/m²     Physical Exam  Constitutional:       General: He is not in acute distress.     Appearance: Alert. He is obese. Seen sitting on couch.   HENT:      Head: Normocephalic and atraumatic.      Nose: Nose  normal.      Mouth/Throat:      Mouth: Mucous membranes are moist.      Pharynx: Oropharynx is clear.   Eyes:      Pupils: Pupils are equal, round, and reactive to light.   Cardiovascular:      Rate and Rhythm: Normal rate and regular rhythm.      Heart sounds: No murmur heard.     No friction rub. No gallop.   Pulmonary:      Effort: Pulmonary effort is normal. No respiratory distress.      Breath sounds: Normal breath sounds. No wheezing, rhonchi or rales.   Abdominal:      General: Bowel sounds are normal. There is no distension.      Palpations: Abdomen is soft.      Tenderness: There is no abdominal tenderness.   Musculoskeletal:         General: Normal range of motion.      Cervical back: Neck supple.      Right lower leg: No edema.      Left lower leg: No edema.   Skin:     General: Skin is warm and dry.   Neurological:      General: No focal deficit present.      Mental Status: He is alert and oriented to person, place, and time.   Psychiatric:         Mood and Affect: Mood normal.         Behavior: Behavior normal.      Assessment/Plan   Diagnoses and all orders for this visit:  Gastroesophageal reflux disease without esophagitis  -     omeprazole (PriLOSEC) 40 mg DR capsule; Take 1 capsule (40 mg) by mouth once daily in the morning. Take before meals. Do not crush or chew.  -chronic, stable, no current GI concerns, will decrease Omeprazole to once daily     Autism ([facility])  -chronic, resides in group home  -continue safety measures and supportive care     Nonintractable epilepsy without status epilepticus, unspecified epilepsy type (Multi)  -chronic, stable, no recent seizure activity reported  -continue Zonisamide   -follow up with neurology annually      Chronic idiopathic constipation  -chronic, stable at this time, no current bowel concerns   -continue routine Colace     Insomnia due to other mental disorder (CODE)  -chronic, well managed with Trazodone     Difficulty balancing when bending  -      Referral to Home Care; Future  -chronic, endorses difficulty with balance/ambulation  -will refer to [facility] for therapy evaluation     Patient stable. Will plan to update routine labs at next follow up.        Adenike Abad, CODY-CNP

## 2024-10-07 ENCOUNTER — HOME CARE VISIT (OUTPATIENT)
Dept: HOME HEALTH SERVICES | Facility: HOME HEALTH | Age: 29
End: 2024-10-07
Payer: MEDICARE

## 2024-10-07 PROCEDURE — 169592 NO-PAY CLAIM PROCEDURE

## 2024-10-07 PROCEDURE — G0151 HHCP-SERV OF PT,EA 15 MIN: HCPCS | Mod: HHH

## 2024-10-07 ASSESSMENT — ENCOUNTER SYMPTOMS
DENIES PAIN: 1
PERSON REPORTING PAIN: PATIENT

## 2024-10-07 ASSESSMENT — ACTIVITIES OF DAILY LIVING (ADL)
OASIS_M1830: 00
ENTERING_EXITING_HOME: SUPERVISION

## 2024-10-07 NOTE — HOME HEALTH
Epilepsy as a 15 year old.   Balance issues, some near falls.  Last fall was years ago.  Last Winter was the last fall.  Lived in this residence since the spring time.  Plan to remain here.  Spends time at a day program which is work but also go places.    Single limb stance Right AND Left:  over 30 seconds each.    TUG 7 seconds.    Taught written exercises of standing  1.  Hip flexion  2.  Hip extension  3.  Hip abduction  4.  Knee flexion  5.  Mini squats  6.  Toe Raises       for up to 20 repetitions or 2 minutes each.  Patient performed these while standing at the bathroom sink and demonstrated competence in completing them safely.

## 2024-10-10 ENCOUNTER — HOME CARE VISIT (OUTPATIENT)
Dept: HOME HEALTH SERVICES | Facility: HOME HEALTH | Age: 29
End: 2024-10-10
Payer: MEDICARE

## 2024-10-10 PROCEDURE — G0152 HHCP-SERV OF OT,EA 15 MIN: HCPCS | Mod: HHH

## 2024-10-10 ASSESSMENT — ACTIVITIES OF DAILY LIVING (ADL)
DRESSING_LB_CURRENT_FUNCTION: INDEPENDENT
TOILETING: INDEPENDENT
BATHING_CURRENT_FUNCTION: INDEPENDENT
CURRENT_FUNCTION: INDEPENDENT
DRESSING_UB_CURRENT_FUNCTION: INDEPENDENT
FEEDING: INDEPENDENT
GROOMING_CURRENT_FUNCTION: INDEPENDENT
AMBULATION ASSISTANCE: INDEPENDENT
AMBULATION ASSISTANCE: 1
GROOMING ASSESSED: 1
FEEDING ASSESSED: 1
TOILETING: 1
PHYSICAL TRANSFERS ASSESSED: 1
BATHING ASSESSED: 1

## 2024-10-10 ASSESSMENT — ENCOUNTER SYMPTOMS
SUBJECTIVE PAIN PROGRESSION: UNCHANGED
PERSON REPORTING PAIN: PATIENT
HIGHEST PAIN SEVERITY IN PAST 24 HOURS: 0/10
PAIN SEVERITY GOAL: 0/10
LOWEST PAIN SEVERITY IN PAST 24 HOURS: 0/10
DENIES PAIN: 1

## 2024-11-13 ENCOUNTER — HOME CARE VISIT (OUTPATIENT)
Dept: HOME HEALTH SERVICES | Facility: HOME HEALTH | Age: 29
End: 2024-11-13
Payer: MEDICARE

## 2024-11-13 ASSESSMENT — ACTIVITIES OF DAILY LIVING (ADL)
OASIS_M1830: 00
HOME_HEALTH_OASIS: 00

## 2025-03-24 ENCOUNTER — TELEPHONE (OUTPATIENT)
Dept: PRIMARY CARE | Facility: CLINIC | Age: 30
End: 2025-03-24
Payer: MEDICARE

## 2025-03-24 NOTE — TELEPHONE ENCOUNTER
3/25/25 House Calls visit with Adenike Abad NP confirmed via phone with Elisabeth Sanz/ caregiver.

## 2025-03-25 ENCOUNTER — NURSING HOME VISIT (OUTPATIENT)
Dept: POST ACUTE CARE | Facility: EXTERNAL LOCATION | Age: 30
End: 2025-03-25
Payer: MEDICARE

## 2025-03-25 VITALS
HEIGHT: 72 IN | TEMPERATURE: 97.8 F | SYSTOLIC BLOOD PRESSURE: 124 MMHG | RESPIRATION RATE: 16 BRPM | BODY MASS INDEX: 40.23 KG/M2 | HEART RATE: 72 BPM | DIASTOLIC BLOOD PRESSURE: 72 MMHG | WEIGHT: 297 LBS | OXYGEN SATURATION: 99 %

## 2025-03-25 DIAGNOSIS — F33.0 MILD EPISODE OF RECURRENT MAJOR DEPRESSIVE DISORDER (CMS-HCC): ICD-10-CM

## 2025-03-25 DIAGNOSIS — K59.04 CHRONIC IDIOPATHIC CONSTIPATION: ICD-10-CM

## 2025-03-25 DIAGNOSIS — F84.0 AUTISM (HHS-HCC): Primary | ICD-10-CM

## 2025-03-25 DIAGNOSIS — F41.1 GENERALIZED ANXIETY DISORDER: ICD-10-CM

## 2025-03-25 DIAGNOSIS — K21.9 GASTROESOPHAGEAL REFLUX DISEASE WITHOUT ESOPHAGITIS: ICD-10-CM

## 2025-03-25 DIAGNOSIS — G40.909 NONINTRACTABLE EPILEPSY WITHOUT STATUS EPILEPTICUS, UNSPECIFIED EPILEPSY TYPE (MULTI): ICD-10-CM

## 2025-03-25 PROCEDURE — 99349 HOME/RES VST EST MOD MDM 40: CPT | Performed by: NURSE PRACTITIONER

## 2025-03-25 ASSESSMENT — PAIN SCALES - GENERAL: PAINLEVEL_OUTOF10: 0-NO PAIN

## 2025-03-25 NOTE — PROGRESS NOTES
Subjective   Patient ID: Daniel Laughlin is a 29 y.o. male who presents for Follow-up (Routine follow up, chronic medical conditions ).    Visit for 30 y/o male seen today at [facility], accompanied by  Elisabeth for routine follow up of chronic medical conditions. PMHx of Autism, migraines, Epilepsy, GERD, Hyperlipidemia, Vitamin D deficiency, seasonal allergies, decreased hearing of left ear, constipation, hallucinations, insomnia. Pt is standing in kitchen for exam. He is alert, able to answer most questions regarding his health. Pt is slow to respond at times and will often stutter or answer with one or two word responses so facility staff does supplement the history as needed. PCP is Dr. Graham and patient had routine follow up on 11/13/24. Patient continues to reside in a group home. He requires assistance with his medications and meal preparation. Pt is able to do his own dressing, toileting and showering. He goes to day program Monday through Friday. There have been no reported concerns while out of facility. Pt is ambulatory without assistive device. He denies recent fall or injury. Pt did report balance difficulty with near falls during last follow up. He was referred to [facility] for PT/OT evaluation which he had consult but did not go through with subsequent visits. Pt endorses intentional weight loss. He reports trying to eat healthier, has reduced his portion sizes and has lost almost 20 lb. Pt denies abdominal pain, nausea, vomiting. He denies any current bowel or bladder concerns. Pt has history of seizure disorder. He continues on Zonisamide. Denies any recent seizure activity. Pt has autism, anxiety, depression. He was referred to [facility] and will be establishing care with Lea Medina for home visits in April. Pt reports that he worries and has a lot of anxiety over the news and fear of a nuclear war. He denies SI.          Current Outpatient  Medications:     Colace 100 mg capsule, Take 1 capsule (100 mg) by mouth 2 times a day., Disp: , Rfl:     EPINEPHrine 0.3 mg/0.3 mL injection syringe, Inject 0.3 mL (0.3 mg) into the muscle 1 time if needed for anaphylaxis. As Directed, Disp: , Rfl:     FLUoxetine (PROzac) 20 mg capsule, Take 3 capsules (60 mg) by mouth once daily., Disp: , Rfl:     ibuprofen 600 mg tablet, Take 1 tablet (600 mg) by mouth every 6 hours if needed for mild pain (1 - 3)., Disp: , Rfl:     omeprazole (PriLOSEC) 40 mg DR capsule, Take 1 capsule (40 mg) by mouth once daily in the morning. Take before meals. Do not crush or chew., Disp: 30 capsule, Rfl: 11    traZODone (Desyrel) 100 mg tablet, Take 1 tablet (100 mg) by mouth once daily at bedtime., Disp: , Rfl:     Vitamin C 500 mg tablet, Take 2 tablets (1,000 mg) by mouth once daily., Disp: , Rfl:     Vitamin D3 10 mcg (400 unit) tablet, Take 2 tablets (20 mcg) by mouth once daily., Disp: , Rfl:     zonisamide (Zonegran) 100 mg capsule, Take 3 capsules (300 mg) by mouth twice a day., Disp: , Rfl:     ZyrTEC 10 mg tablet, Take 1 tablet (10 mg) by mouth once daily., Disp: , Rfl:      Review of Systems  Constitutional: Positive for intentional weight loss. Negative for appetite change, chills and fever.   HENT: Negative for trouble swallowing, sore throat.   Respiratory:  Negative for cough, shortness of breath and wheezing.    Cardiovascular:  Negative for chest pain, palpitations, edema.    Gastrointestinal: Negative for abdominal pain, constipation, diarrhea, nausea and vomiting.   Genitourinary:  Negative for dysuria and hematuria.   Musculoskeletal:  Negative for pain, falls.   Neurological: Negative for dizziness and light-headedness.   Psychiatric/Behavioral:  Positive for depression, anxiety, insomnia    Objective   /72 (BP Location: Right arm, Patient Position: Sitting, BP Cuff Size: Adult)   Pulse 72   Temp 36.6 °C (97.8 °F) (Temporal)   Resp 16   Ht 1.829 m (6')   Wt  135 kg (297 lb)   SpO2 99%   BMI 40.28 kg/m²     Physical Exam  Constitutional:       General: He is not in acute distress.     Appearance: Alert. He is obese.   HENT:      Head: Normocephalic and atraumatic.      Nose: Nose normal.      Mouth/Throat:      Mouth: Mucous membranes are moist.      Pharynx: Oropharynx is clear.   Eyes:      Pupils: Pupils are equal, round, and reactive to light.   Cardiovascular:      Rate and Rhythm: Normal rate and regular rhythm.      Heart sounds: No murmur heard.     No friction rub. No gallop.   Pulmonary:      Effort: Pulmonary effort is normal. No respiratory distress.      Breath sounds: Normal breath sounds. No wheezing, rhonchi or rales.   Abdominal:      General: Bowel sounds are normal. There is no distension.      Palpations: Abdomen is soft.      Tenderness: There is no abdominal tenderness.   Musculoskeletal:         General: Normal range of motion.      Cervical back: Neck supple.      Right lower leg: No edema.      Left lower leg: No edema.   Skin:     General: Skin is warm and dry.   Neurological:      General: No focal deficit present.      Mental Status: He is alert and oriented to person, place, and time.   Psychiatric:         Mood and Affect: Mood normal. Flat affect.      Behavior: Behavior normal.     Assessment/Plan   Diagnoses and all orders for this visit:  Autism ([facility])  -chronic, resides in group home, requires facility supervision  -pt to continue going to day program Monday-Friday   -continue safety measures and supportive care    Gastroesophageal reflux disease without esophagitis  -chronic, stable, no current GI concerns  -continue Omeprazole     Nonintractable epilepsy without status epilepticus, unspecified epilepsy type (Multi)  -chronic, stable, no recent seizure activity reported  -continue Zonisamide   -follow up with neurology annually    Chronic idiopathic constipation  -chronic, stable, managed with routine Colace     Generalized  anxiety disorder  Mild episode of recurrent major depressive disorder (CMS-HCC)  -chronic, will be establishing care with mental health NP in April   -continue Fluoxetine, Trazodone per order          Adenike Abad, CODY-CNP

## 2025-05-12 ENCOUNTER — TELEPHONE (OUTPATIENT)
Dept: PRIMARY CARE | Facility: CLINIC | Age: 30
End: 2025-05-12
Payer: MEDICARE

## 2025-05-13 ENCOUNTER — NURSING HOME VISIT (OUTPATIENT)
Dept: POST ACUTE CARE | Facility: EXTERNAL LOCATION | Age: 30
End: 2025-05-13
Payer: MEDICARE

## 2025-05-13 VITALS
HEIGHT: 72 IN | HEART RATE: 78 BPM | WEIGHT: 300 LBS | DIASTOLIC BLOOD PRESSURE: 68 MMHG | RESPIRATION RATE: 18 BRPM | SYSTOLIC BLOOD PRESSURE: 118 MMHG | OXYGEN SATURATION: 95 % | TEMPERATURE: 97.3 F | BODY MASS INDEX: 40.63 KG/M2

## 2025-05-13 DIAGNOSIS — E78.2 MIXED HYPERLIPIDEMIA: ICD-10-CM

## 2025-05-13 DIAGNOSIS — F99 INSOMNIA DUE TO OTHER MENTAL DISORDER: ICD-10-CM

## 2025-05-13 DIAGNOSIS — J30.2 SEASONAL ALLERGIES: ICD-10-CM

## 2025-05-13 DIAGNOSIS — E55.9 VITAMIN D DEFICIENCY: ICD-10-CM

## 2025-05-13 DIAGNOSIS — K59.04 CHRONIC IDIOPATHIC CONSTIPATION: ICD-10-CM

## 2025-05-13 DIAGNOSIS — G40.909 NONINTRACTABLE EPILEPSY WITHOUT STATUS EPILEPTICUS, UNSPECIFIED EPILEPSY TYPE (MULTI): ICD-10-CM

## 2025-05-13 DIAGNOSIS — F51.05 INSOMNIA DUE TO OTHER MENTAL DISORDER: ICD-10-CM

## 2025-05-13 DIAGNOSIS — F84.0 AUTISM (HHS-HCC): Primary | ICD-10-CM

## 2025-05-13 DIAGNOSIS — K21.9 GASTROESOPHAGEAL REFLUX DISEASE WITHOUT ESOPHAGITIS: ICD-10-CM

## 2025-05-13 RX ORDER — BLOOD PRESSURE TEST KIT-WRIST
100 KIT MISCELLANEOUS DAILY
COMMUNITY
Start: 2025-04-16 | End: 2025-07-15

## 2025-05-13 RX ORDER — CYANOCOBALAMIN (VITAMIN B-12) 500 MCG
1 TABLET ORAL NIGHTLY
COMMUNITY
Start: 2025-04-28

## 2025-05-13 ASSESSMENT — ENCOUNTER SYMPTOMS
TREMORS: 0
PALPITATIONS: 0
UNEXPECTED WEIGHT CHANGE: 0
CHILLS: 0
ABDOMINAL PAIN: 0
SHORTNESS OF BREATH: 0
WHEEZING: 0
BRUISES/BLEEDS EASILY: 0
COUGH: 0
SLEEP DISTURBANCE: 1
DYSPHORIC MOOD: 1
APPETITE CHANGE: 0
ARTHRALGIAS: 0
FATIGUE: 0
CHEST TIGHTNESS: 0
LIGHT-HEADEDNESS: 0
SORE THROAT: 0
RHINORRHEA: 1
DIZZINESS: 0
TROUBLE SWALLOWING: 0
DIFFICULTY URINATING: 0
VOMITING: 0
CONSTIPATION: 0
NAUSEA: 0
FEVER: 0
DIARRHEA: 1

## 2025-05-13 ASSESSMENT — PAIN SCALES - GENERAL: PAINLEVEL_OUTOF10: 2

## 2025-05-13 NOTE — LETTER
"Patient: Daniel Laughlin  : 1995    Encounter Date: 2025    Subjective  Patient ID: Daniel Laughlin is a 29 y.o. male who presents for Follow-up (Routine follow up, chronic medical conditions, labs ).    Visit for 30 y/o male seen today at [facility], accompanied by  Elisabeth for routine follow up of chronic medical conditions.     PMHx of Autism, migraines, Epilepsy, GERD, Hyperlipidemia, Vitamin D deficiency, seasonal allergies, decreased hearing of left ear, constipation, hallucinations, insomnia.     Pt is standing in kitchen this morning. He is alert, able to answer most questions regarding his health. Pt is slow to respond at times and will often stutter or answer with one or two word responses. Facility staff will supplement the health history as needed. Pt reports having a migraine this morning. He tells me that it started last night, he was able to sleep but is still having a migraine today. He will get migraines \"once in a while, not everyday\", mostly with weather changes. He denies any associated dizziness, lightheadedness, nausea, or vomiting. Pt does have seasonal allergies, managed with Zyrtec. He endorses nasal drainage but denies fever, chills, congestion, sore throat. Pt resides in a group home. He requires assistance with his medications and meal preparation. Pt is able to do his own dressing, toileting and showering. He goes to day program Monday through Friday. Pt is ambulatory without assistive device. He says that he will occasionally get off balance but denies recent fall or injury. Pt denies appetite changes, abdominal pain, nausea, vomiting. He denies any current bowel or bladder concerns. Pt has history of seizure disorder. He continues on Zonisamide. Denies any recent seizure activity. Pt has autism, anxiety, depression. He established with Lea Medina through [facility] last month and was started on Magnesium Glycinate and " Melatonin for sleep. Staff reports that patient doesn't like when he is told what he should and shouldn't do and talks smart towards staff, can get easily agitated but has not had any obvious aggression.          Current Outpatient Medications:   •  melatonin 1 mg tablet, Take 1 tablet (1 mg) by mouth once daily at bedtime., Disp: , Rfl:   •  Colace 100 mg capsule, Take 1 capsule (100 mg) by mouth 2 times a day., Disp: , Rfl:   •  EPINEPHrine 0.3 mg/0.3 mL injection syringe, Inject 0.3 mL (0.3 mg) into the muscle 1 time if needed for anaphylaxis. As Directed, Disp: , Rfl:   •  FLUoxetine (PROzac) 20 mg capsule, Take 3 capsules (60 mg) by mouth once daily., Disp: , Rfl:   •  ibuprofen 600 mg tablet, Take 1 tablet (600 mg) by mouth every 6 hours if needed for mild pain (1 - 3)., Disp: , Rfl:   •  omeprazole (PriLOSEC) 40 mg DR capsule, Take 1 capsule (40 mg) by mouth once daily in the morning. Take before meals. Do not crush or chew., Disp: 30 capsule, Rfl: 11  •  traZODone (Desyrel) 100 mg tablet, Take 1 tablet (100 mg) by mouth once daily at bedtime., Disp: , Rfl:   •  Vitamin C 500 mg tablet, Take 2 tablets (1,000 mg) by mouth once daily., Disp: , Rfl:   •  Vitamin D3 10 mcg (400 unit) tablet, Take 2 tablets (20 mcg) by mouth once daily., Disp: , Rfl:   •  zonisamide (Zonegran) 100 mg capsule, Take 3 capsules (300 mg) by mouth twice a day., Disp: , Rfl:   •  ZyrTEC 10 mg tablet, Take 1 tablet (10 mg) by mouth once daily., Disp: , Rfl:      Review of Systems   Constitutional:  Negative for appetite change, chills, fatigue, fever and unexpected weight change.   HENT:  Positive for rhinorrhea. Negative for congestion, hearing loss, sore throat and trouble swallowing.    Eyes:  Negative for visual disturbance.   Respiratory:  Negative for cough, chest tightness, shortness of breath and wheezing.    Cardiovascular:  Negative for chest pain, palpitations and leg swelling.   Gastrointestinal:  Positive for diarrhea  (occasional). Negative for abdominal pain, constipation, nausea and vomiting.   Genitourinary:  Negative for difficulty urinating.   Musculoskeletal:  Negative for arthralgias and gait problem (does admit to losing balance at times).   Neurological:  Negative for dizziness, tremors and light-headedness.        Occasional migraines    Hematological:  Does not bruise/bleed easily.   Psychiatric/Behavioral:  Positive for dysphoric mood and sleep disturbance.      Objective  /68 (BP Location: Left arm, Patient Position: Sitting, BP Cuff Size: Adult)   Pulse 78   Temp 36.3 °C (97.3 °F) (Temporal)   Resp 18   Ht 1.829 m (6')   Wt 136 kg (300 lb)   SpO2 95%   BMI 40.69 kg/m²     Physical Exam  Constitutional:       General: He is not in acute distress.     Appearance: Alert. He is obese. Standing in kitchen.   HENT:      Head: Normocephalic and atraumatic.      Nose: Clear rhinorrhea     Mouth/Throat:      Mouth: Mucous membranes are moist.      Pharynx: Oropharynx is clear.   Eyes:      Pupils: Pupils are equal, round, and reactive to light.   Cardiovascular:      Rate and Rhythm: Normal rate and regular rhythm.      Heart sounds: No murmur heard.     No friction rub. No gallop.   Pulmonary:      Effort: Pulmonary effort is normal. No respiratory distress.      Breath sounds: Normal breath sounds. No wheezing, rhonchi or rales.   Abdominal:      General: Bowel sounds are normal. There is no distension.      Palpations: Abdomen is soft.      Tenderness: There is no abdominal tenderness.   Musculoskeletal:         General: Normal range of motion.      Cervical back: Neck supple.      Right lower leg: No edema.      Left lower leg: No edema.   Skin:     General: Skin is warm and dry.   Neurological:      General: No focal deficit present.      Mental Status: He is alert and oriented to person, place, and time.   Psychiatric:         Mood and Affect: Mood normal. Flat affect.      Behavior: Behavior normal.       Assessment/Plan  Diagnoses and all orders for this visit:  Autism ([facility])  -     CBC and Auto Differential; Future  -     Comprehensive metabolic panel; Future  -chronic, resides in group home, requires facility supervision, goes to day program Monday-Friday   -continue safety measures and supportive care    Seasonal allergies  -chronic, managed with Zyrtec     Mixed hyperlipidemia  -     Lipid panel; Future    Chronic idiopathic constipation  -chronic, stable, managed with routine Colace     Gastroesophageal reflux disease without esophagitis  -chronic, stable, no current GI concerns   -continue Omeprazole     Vitamin D deficiency  -     Vitamin D 25-Hydroxy,Total (for eval of Vitamin D levels); Future  -continue cholecalciferol/vitamin C supplement     Nonintractable epilepsy without status epilepticus, unspecified epilepsy type (Multi)  -chronic, stable, no recent seizure activity noted   -continue Zonisamide   -follow up with neurology annually    Insomnia due to other mental disorder   -chronic, established with [facility] last month   -continue trazodone, melatonin, magnesium glycinate as prescribed      Labs obtained in home- CBC, CMP, Lipid panel, Vitamin D Deficiency- pt tolerated well        ROMI Daigle     Electronically Signed By: ROMI Daigle   5/16/25  6:08 AM   No

## 2025-05-13 NOTE — PROGRESS NOTES
"Subjective   Patient ID: Daniel Laughlin is a 29 y.o. male who presents for Follow-up (Routine follow up, chronic medical conditions, labs ).    Visit for 28 y/o male seen today at [facility], accompanied by  Elisabeth for routine follow up of chronic medical conditions.     PMHx of Autism, migraines, Epilepsy, GERD, Hyperlipidemia, Vitamin D deficiency, seasonal allergies, decreased hearing of left ear, constipation, hallucinations, insomnia.     Pt is standing in kitchen this morning. He is alert, able to answer most questions regarding his health. Pt is slow to respond at times and will often stutter or answer with one or two word responses. Facility staff will supplement the health history as needed. Pt reports having a migraine this morning. He tells me that it started last night, he was able to sleep but is still having a migraine today. He will get migraines \"once in a while, not everyday\", mostly with weather changes. He denies any associated dizziness, lightheadedness, nausea, or vomiting. Pt does have seasonal allergies, managed with Zyrtec. He endorses nasal drainage but denies fever, chills, congestion, sore throat. Pt resides in a group home. He requires assistance with his medications and meal preparation. Pt is able to do his own dressing, toileting and showering. He goes to day program Monday through Friday. Pt is ambulatory without assistive device. He says that he will occasionally get off balance but denies recent fall or injury. Pt denies appetite changes, abdominal pain, nausea, vomiting. He denies any current bowel or bladder concerns. Pt has history of seizure disorder. He continues on Zonisamide. Denies any recent seizure activity. Pt has autism, anxiety, depression. He established with Lea Medina through [facility] last month and was started on Magnesium Glycinate and Melatonin for sleep. Staff reports that patient doesn't like when he is told " what he should and shouldn't do and talks smart towards staff, can get easily agitated but has not had any obvious aggression.          Current Outpatient Medications:     melatonin 1 mg tablet, Take 1 tablet (1 mg) by mouth once daily at bedtime., Disp: , Rfl:     Colace 100 mg capsule, Take 1 capsule (100 mg) by mouth 2 times a day., Disp: , Rfl:     EPINEPHrine 0.3 mg/0.3 mL injection syringe, Inject 0.3 mL (0.3 mg) into the muscle 1 time if needed for anaphylaxis. As Directed, Disp: , Rfl:     FLUoxetine (PROzac) 20 mg capsule, Take 3 capsules (60 mg) by mouth once daily., Disp: , Rfl:     ibuprofen 600 mg tablet, Take 1 tablet (600 mg) by mouth every 6 hours if needed for mild pain (1 - 3)., Disp: , Rfl:     omeprazole (PriLOSEC) 40 mg DR capsule, Take 1 capsule (40 mg) by mouth once daily in the morning. Take before meals. Do not crush or chew., Disp: 30 capsule, Rfl: 11    traZODone (Desyrel) 100 mg tablet, Take 1 tablet (100 mg) by mouth once daily at bedtime., Disp: , Rfl:     Vitamin C 500 mg tablet, Take 2 tablets (1,000 mg) by mouth once daily., Disp: , Rfl:     Vitamin D3 10 mcg (400 unit) tablet, Take 2 tablets (20 mcg) by mouth once daily., Disp: , Rfl:     zonisamide (Zonegran) 100 mg capsule, Take 3 capsules (300 mg) by mouth twice a day., Disp: , Rfl:     ZyrTEC 10 mg tablet, Take 1 tablet (10 mg) by mouth once daily., Disp: , Rfl:      Review of Systems   Constitutional:  Negative for appetite change, chills, fatigue, fever and unexpected weight change.   HENT:  Positive for rhinorrhea. Negative for congestion, hearing loss, sore throat and trouble swallowing.    Eyes:  Negative for visual disturbance.   Respiratory:  Negative for cough, chest tightness, shortness of breath and wheezing.    Cardiovascular:  Negative for chest pain, palpitations and leg swelling.   Gastrointestinal:  Positive for diarrhea (occasional). Negative for abdominal pain, constipation, nausea and vomiting.   Genitourinary:   Negative for difficulty urinating.   Musculoskeletal:  Negative for arthralgias and gait problem (does admit to losing balance at times).   Neurological:  Negative for dizziness, tremors and light-headedness.        Occasional migraines    Hematological:  Does not bruise/bleed easily.   Psychiatric/Behavioral:  Positive for dysphoric mood and sleep disturbance.      Objective   /68 (BP Location: Left arm, Patient Position: Sitting, BP Cuff Size: Adult)   Pulse 78   Temp 36.3 °C (97.3 °F) (Temporal)   Resp 18   Ht 1.829 m (6')   Wt 136 kg (300 lb)   SpO2 95%   BMI 40.69 kg/m²     Physical Exam  Constitutional:       General: He is not in acute distress.     Appearance: Alert. He is obese. Standing in kitchen.   HENT:      Head: Normocephalic and atraumatic.      Nose: Clear rhinorrhea     Mouth/Throat:      Mouth: Mucous membranes are moist.      Pharynx: Oropharynx is clear.   Eyes:      Pupils: Pupils are equal, round, and reactive to light.   Cardiovascular:      Rate and Rhythm: Normal rate and regular rhythm.      Heart sounds: No murmur heard.     No friction rub. No gallop.   Pulmonary:      Effort: Pulmonary effort is normal. No respiratory distress.      Breath sounds: Normal breath sounds. No wheezing, rhonchi or rales.   Abdominal:      General: Bowel sounds are normal. There is no distension.      Palpations: Abdomen is soft.      Tenderness: There is no abdominal tenderness.   Musculoskeletal:         General: Normal range of motion.      Cervical back: Neck supple.      Right lower leg: No edema.      Left lower leg: No edema.   Skin:     General: Skin is warm and dry.   Neurological:      General: No focal deficit present.      Mental Status: He is alert and oriented to person, place, and time.   Psychiatric:         Mood and Affect: Mood normal. Flat affect.      Behavior: Behavior normal.      Assessment/Plan   Diagnoses and all orders for this visit:  Autism ([facility])  -     CBC and  Auto Differential; Future  -     Comprehensive metabolic panel; Future  -chronic, resides in group home, requires facility supervision, goes to day program Monday-Friday   -continue safety measures and supportive care    Seasonal allergies  -chronic, managed with Zyrtec     Mixed hyperlipidemia  -     Lipid panel; Future    Chronic idiopathic constipation  -chronic, stable, managed with routine Colace     Gastroesophageal reflux disease without esophagitis  -chronic, stable, no current GI concerns   -continue Omeprazole     Vitamin D deficiency  -     Vitamin D 25-Hydroxy,Total (for eval of Vitamin D levels); Future  -continue cholecalciferol/vitamin C supplement     Nonintractable epilepsy without status epilepticus, unspecified epilepsy type (Multi)  -chronic, stable, no recent seizure activity noted   -continue Zonisamide   -follow up with neurology annually    Insomnia due to other mental disorder   -chronic, established with [facility] last month   -continue trazodone, melatonin, magnesium glycinate as prescribed      Labs obtained in home- CBC, CMP, Lipid panel, Vitamin D Deficiency- pt tolerated well        CODY Daigle-CNP

## 2025-05-14 LAB
25(OH)D3+25(OH)D2 SERPL-MCNC: 29 NG/ML (ref 30–100)
ALBUMIN SERPL-MCNC: 4.9 G/DL (ref 3.6–5.1)
ALP SERPL-CCNC: 84 U/L (ref 36–130)
ALT SERPL-CCNC: 34 U/L (ref 9–46)
ANION GAP SERPL CALCULATED.4IONS-SCNC: 10 MMOL/L (CALC) (ref 7–17)
AST SERPL-CCNC: 23 U/L (ref 10–40)
BASOPHILS # BLD AUTO: 39 CELLS/UL (ref 0–200)
BASOPHILS NFR BLD AUTO: 0.7 %
BILIRUB SERPL-MCNC: 1.1 MG/DL (ref 0.2–1.2)
BUN SERPL-MCNC: 14 MG/DL (ref 7–25)
CALCIUM SERPL-MCNC: 9.3 MG/DL (ref 8.6–10.3)
CHLORIDE SERPL-SCNC: 104 MMOL/L (ref 98–110)
CHOLEST SERPL-MCNC: 174 MG/DL
CHOLEST/HDLC SERPL: 4 (CALC)
CO2 SERPL-SCNC: 25 MMOL/L (ref 20–32)
CREAT SERPL-MCNC: 1.13 MG/DL (ref 0.6–1.26)
EGFRCR SERPLBLD CKD-EPI 2021: 90 ML/MIN/1.73M2
EOSINOPHIL # BLD AUTO: 110 CELLS/UL (ref 15–500)
EOSINOPHIL NFR BLD AUTO: 2 %
ERYTHROCYTE [DISTWIDTH] IN BLOOD BY AUTOMATED COUNT: 11.9 % (ref 11–15)
GLUCOSE SERPL-MCNC: 88 MG/DL (ref 65–99)
HCT VFR BLD AUTO: 51.1 % (ref 38.5–50)
HDLC SERPL-MCNC: 43 MG/DL
HGB BLD-MCNC: 16.6 G/DL (ref 13.2–17.1)
LDLC SERPL CALC-MCNC: 107 MG/DL (CALC)
LYMPHOCYTES # BLD AUTO: 1656 CELLS/UL (ref 850–3900)
LYMPHOCYTES NFR BLD AUTO: 30.1 %
MCH RBC QN AUTO: 30.7 PG (ref 27–33)
MCHC RBC AUTO-ENTMCNC: 32.5 G/DL (ref 32–36)
MCV RBC AUTO: 94.5 FL (ref 80–100)
MONOCYTES # BLD AUTO: 446 CELLS/UL (ref 200–950)
MONOCYTES NFR BLD AUTO: 8.1 %
NEUTROPHILS # BLD AUTO: 3251 CELLS/UL (ref 1500–7800)
NEUTROPHILS NFR BLD AUTO: 59.1 %
NONHDLC SERPL-MCNC: 131 MG/DL (CALC)
PLATELET # BLD AUTO: 198 THOUSAND/UL (ref 140–400)
PMV BLD REES-ECKER: 10.7 FL (ref 7.5–12.5)
POTASSIUM SERPL-SCNC: 4.2 MMOL/L (ref 3.5–5.3)
PROT SERPL-MCNC: 7.3 G/DL (ref 6.1–8.1)
RBC # BLD AUTO: 5.41 MILLION/UL (ref 4.2–5.8)
SODIUM SERPL-SCNC: 139 MMOL/L (ref 135–146)
TRIGL SERPL-MCNC: 129 MG/DL
WBC # BLD AUTO: 5.5 THOUSAND/UL (ref 3.8–10.8)

## 2025-06-18 ENCOUNTER — TELEPHONE (OUTPATIENT)
Dept: PRIMARY CARE | Facility: CLINIC | Age: 30
End: 2025-06-18
Payer: MEDICARE

## 2025-06-19 ENCOUNTER — NURSING HOME VISIT (OUTPATIENT)
Dept: POST ACUTE CARE | Facility: EXTERNAL LOCATION | Age: 30
End: 2025-06-19
Payer: MEDICARE

## 2025-06-19 VITALS
WEIGHT: 300 LBS | HEIGHT: 72 IN | DIASTOLIC BLOOD PRESSURE: 84 MMHG | RESPIRATION RATE: 16 BRPM | HEART RATE: 83 BPM | OXYGEN SATURATION: 97 % | TEMPERATURE: 97.3 F | BODY MASS INDEX: 40.63 KG/M2 | SYSTOLIC BLOOD PRESSURE: 124 MMHG

## 2025-06-19 DIAGNOSIS — F84.0 AUTISM (HHS-HCC): ICD-10-CM

## 2025-06-19 DIAGNOSIS — G43.009 MIGRAINE WITHOUT AURA AND WITHOUT STATUS MIGRAINOSUS, NOT INTRACTABLE: Primary | ICD-10-CM

## 2025-06-19 DIAGNOSIS — F99 INSOMNIA DUE TO OTHER MENTAL DISORDER: ICD-10-CM

## 2025-06-19 DIAGNOSIS — E55.9 VITAMIN D DEFICIENCY: ICD-10-CM

## 2025-06-19 DIAGNOSIS — F51.05 INSOMNIA DUE TO OTHER MENTAL DISORDER: ICD-10-CM

## 2025-06-19 DIAGNOSIS — G40.909 NONINTRACTABLE EPILEPSY WITHOUT STATUS EPILEPTICUS, UNSPECIFIED EPILEPSY TYPE (MULTI): ICD-10-CM

## 2025-06-19 DIAGNOSIS — K21.9 GASTROESOPHAGEAL REFLUX DISEASE WITHOUT ESOPHAGITIS: ICD-10-CM

## 2025-06-19 DIAGNOSIS — E78.2 MIXED HYPERLIPIDEMIA: ICD-10-CM

## 2025-06-19 PROCEDURE — 99349 HOME/RES VST EST MOD MDM 40: CPT | Performed by: NURSE PRACTITIONER

## 2025-06-19 RX ORDER — ASPIRIN 325 MG
1.25 TABLET, DELAYED RELEASE (ENTERIC COATED) ORAL WEEKLY
COMMUNITY
Start: 2025-06-12 | End: 2025-06-19 | Stop reason: WASHOUT

## 2025-06-19 RX ORDER — IBUPROFEN 600 MG/1
600 TABLET, FILM COATED ORAL EVERY 6 HOURS PRN
Qty: 120 TABLET | Refills: 1 | Status: SHIPPED | OUTPATIENT
Start: 2025-06-19

## 2025-06-19 ASSESSMENT — PAIN SCALES - GENERAL: PAINLEVEL_OUTOF10: 0-NO PAIN

## 2025-06-19 NOTE — PROGRESS NOTES
Subjective   Patient ID: Daniel Laughlin is a 29 y.o. male who presents for Follow-up (Routine follow up, chronic medical conditions, reports of headaches, needs Ibuprofen refill).    Visit for 30 y/o male seen today at [facility], accompanied by  Elisabeth for routine follow up of chronic medical conditions. PMHx of Autism, migraines, Epilepsy, GERD, Hyperlipidemia, Vitamin D deficiency, seasonal allergies, decreased hearing of left ear, constipation, hallucinations, insomnia.     Patient was initially in his room upon provider arrival. He was able to ambulate into the kitchen and is standing by the kitchen countertop this afternoon. He is alert, able to answer most questions regarding his health. He is slow to respond at times and will often stutter or respond with one or two word answers. His facility caregiver Elisabeth will supplement the health history as needed. Pt continues to reside in a group home. He requires supervision and assistance with his medications and with meal preparation. Pt is able to do his own dressing, toileting and showering. He is ambulatory without assistive device. Denies recent fall or injury. Pt goes to a day program Monday through Friday. He denies any concerns while out of the house. Pt reports having a good appetite. He denies signs of weight loss, abdominal pain. He has GERD but denies any recent indigestion. Pt denies any current bowel or bladder concerns. Pt had a migraine last week. He wanted to take an Ibuprofen for it which typically helps but facility reports that they do not have an active order for the Ibuprofen and are requesting a refill today. Pt denies any associated dizziness, lightheadedness, nausea, or vomiting. His migraine has since resolved. Pt has history of seizure disorder. He continues on Zonisamide. He has not had any recent seizure activity. Pt has autism, anxiety, depression. He follows with Lea Medina through  [facility], last seen in home on 5/27/25. Pt endorses difficulty sleeping. He continues on Magnesium Glycinate and Melatonin for sleep. Reports that both medications are helpful. There have been no recent behavioral concerns reported. Pt denies any acute concerns today. He has not had any recent hospitalizations.          Current Outpatient Medications:     Colace 100 mg capsule, Take 1 capsule (100 mg) by mouth 2 times a day., Disp: , Rfl:     EPINEPHrine 0.3 mg/0.3 mL injection syringe, Inject 0.3 mL (0.3 mg) into the muscle 1 time if needed for anaphylaxis. As Directed, Disp: , Rfl:     FLUoxetine (PROzac) 20 mg capsule, Take 3 capsules (60 mg) by mouth once daily., Disp: , Rfl:     ibuprofen 600 mg tablet, Take 1 tablet (600 mg) by mouth every 6 hours if needed for mild pain (1 - 3)., Disp: 120 tablet, Rfl: 1    magnesium glycinate 100 mg magnesium capsule, Take 1 capsule (100 mg) by mouth once daily., Disp: , Rfl:     melatonin 1 mg tablet, Take 1 tablet (1 mg) by mouth once daily at bedtime., Disp: , Rfl:     omeprazole (PriLOSEC) 40 mg DR capsule, Take 1 capsule (40 mg) by mouth once daily in the morning. Take before meals. Do not crush or chew., Disp: 30 capsule, Rfl: 11    traZODone (Desyrel) 100 mg tablet, Take 1 tablet (100 mg) by mouth once daily at bedtime., Disp: , Rfl:     Vitamin C 500 mg tablet, Take 2 tablets (1,000 mg) by mouth once daily., Disp: , Rfl:     Vitamin D3 10 mcg (400 unit) tablet, Take 2 tablets (20 mcg) by mouth once daily., Disp: , Rfl:     zonisamide (Zonegran) 100 mg capsule, Take 3 capsules (300 mg) by mouth twice a day., Disp: , Rfl:     ZyrTEC 10 mg tablet, Take 1 tablet (10 mg) by mouth once daily., Disp: , Rfl:      Review of Systems  Constitutional: Negative for appetite change, chills, fatigue, fever and unexpected weight change.   HENT: Negative for congestion, hearing loss, sore throat and trouble swallowing.    Eyes:  Negative for visual disturbance.   Respiratory:   Negative for cough, chest tightness, shortness of breath and wheezing.    Cardiovascular:  Negative for chest pain, palpitations and leg swelling.   Gastrointestinal: Negative for abdominal pain, constipation, diarrhea, nausea and vomiting.   Genitourinary:  Negative for difficulty urinating.   Musculoskeletal:  Negative for arthralgias and gait problem  Neurological:  Negative for dizziness, tremors and light-headedness.        Occasional migraines, had 1 last week, has since resolved   Hematological:  Does not bruise/bleed easily.   Psychiatric/Behavioral:  Positive for dysphoric mood and sleep disturbance.      Objective   /84 (BP Location: Left arm, Patient Position: Standing, BP Cuff Size: Large adult)   Pulse 83   Temp 36.3 °C (97.3 °F) (Temporal)   Resp 16   Ht 1.829 m (6')   Wt 136 kg (300 lb)   SpO2 97%   BMI 40.69 kg/m²     Physical Exam  Constitutional:       General: He is not in acute distress.     Appearance: Alert. He is obese. Standing in kitchen.   HENT:      Head: Normocephalic and atraumatic.      Nose: Clear rhinorrhea     Mouth/Throat:      Mouth: Mucous membranes are moist.      Pharynx: Oropharynx is clear.   Eyes:      Pupils: Pupils are equal, round, and reactive to light.   Cardiovascular:      Rate and Rhythm: Normal rate and regular rhythm.      Heart sounds: No murmur heard.     No friction rub. No gallop.   Pulmonary:      Effort: Pulmonary effort is normal. No respiratory distress.      Breath sounds: Normal breath sounds. No wheezing, rhonchi or rales.   Abdominal:      General: Bowel sounds are normal. There is no distension.      Palpations: Abdomen is soft.      Tenderness: There is no abdominal tenderness.   Musculoskeletal:         General: Normal range of motion.      Cervical back: Neck supple.      Right lower leg: No edema.      Left lower leg: No edema.   Skin:     General: Skin is warm and dry.   Neurological:      General: No focal deficit present.       "Mental Status: He is alert and oriented to person, place, and time.   Psychiatric:         Mood and Affect: Mood normal. Flat affect.      Behavior: Behavior normal.     Lab Results   Component Value Date    WBC 5.5 05/13/2025    HGB 16.6 05/13/2025    HCT 51.1 (H) 05/13/2025    MCV 94.5 05/13/2025     05/13/2025       Chemistry    Lab Results   Component Value Date/Time     05/13/2025 1039    K 4.2 05/13/2025 1039     05/13/2025 1039    CO2 25 05/13/2025 1039    BUN 14 05/13/2025 1039    CREATININE 1.13 05/13/2025 1039    Lab Results   Component Value Date/Time    CALCIUM 9.3 05/13/2025 1039    ALKPHOS 84 05/13/2025 1039    AST 23 05/13/2025 1039    ALT 34 05/13/2025 1039    BILITOT 1.1 05/13/2025 1039        Lab Results   Component Value Date    CHOL 174 05/13/2025    CHOL 156 05/22/2024    CHOL 131 02/29/2020     Lab Results   Component Value Date    HDL 43 05/13/2025    HDL 38.0 (L) 05/22/2024    HDL 37.8 (A) 02/29/2020     Lab Results   Component Value Date    LDLCALC 107 (H) 05/13/2025    LDLCALC 92 05/22/2024     Lab Results   Component Value Date    TRIG 129 05/13/2025    TRIG 132 05/22/2024    TRIG 89 02/29/2020     No components found for: \"CHOLHDL\"     5/13/25- Vitamin D level- 29    Assessment/Plan   Diagnoses and all orders for this visit:  Migraine without aura and without status migrainosus, not intractable  -     ibuprofen 600 mg tablet; Take 1 tablet (600 mg) by mouth every 6 hours if needed for mild pain (1 - 3).    Autism ([facility])  -chronic, resides in group home, requires facility supervision, -continue day program Monday-Friday     Nonintractable epilepsy without status epilepticus, unspecified epilepsy type (Multi)  -chronic, stable, no recent seizure activity noted   -continue Zonisamide   -follow up with neurology annually    Mixed hyperlipidemia  -chronic, last lipid panel with elevated LDL of 107, goal <100  -discussed healthy diet habits   -will add statin if LDL " remains high  -encouraged increased physical activity     Gastroesophageal reflux disease without esophagitis  -chronic, stable, no current GI concerns   -continue Omeprazole     Vitamin D deficiency  -chronic, last vitamin D level 29  -continue cholecalciferol/vitamin c supplement as ordered     Insomnia due to other mental disorder  -chronic, follows with [facility]   -continue trazodone, melatonin, magnesium glycinate        Adenike Abad, APRN-CNP

## 2025-06-19 NOTE — LETTER
Patient: Daniel Lauglhin  : 1995    Encounter Date: 2025    Subjective  Patient ID: Daniel Laughlin is a 29 y.o. male who presents for Follow-up (Routine follow up, chronic medical conditions, reports of headaches, needs Ibuprofen refill).    Visit for 30 y/o male seen today at [facility], accompanied by  Elisabeth for routine follow up of chronic medical conditions. PMHx of Autism, migraines, Epilepsy, GERD, Hyperlipidemia, Vitamin D deficiency, seasonal allergies, decreased hearing of left ear, constipation, hallucinations, insomnia.     Patient was initially in his room upon provider arrival. He was able to ambulate into the kitchen and is standing by the kitchen countertop this afternoon. He is alert, able to answer most questions regarding his health. He is slow to respond at times and will often stutter or respond with one or two word answers. His facility caregiver Elisabeth will supplement the health history as needed. Pt continues to reside in a group home. He requires supervision and assistance with his medications and with meal preparation. Pt is able to do his own dressing, toileting and showering. He is ambulatory without assistive device. Denies recent fall or injury. Pt goes to a day program Monday through Friday. He denies any concerns while out of the house. Pt reports having a good appetite. He denies signs of weight loss, abdominal pain. He has GERD but denies any recent indigestion. Pt denies any current bowel or bladder concerns. Pt had a migraine last week. He wanted to take an Ibuprofen for it which typically helps but facility reports that they do not have an active order for the Ibuprofen and are requesting a refill today. Pt denies any associated dizziness, lightheadedness, nausea, or vomiting. His migraine has since resolved. Pt has history of seizure disorder. He continues on Zonisamide. He has not had any recent seizure activity. Pt  has autism, anxiety, depression. He follows with Lea Medina through [facility], last seen in home on 5/27/25. Pt endorses difficulty sleeping. He continues on Magnesium Glycinate and Melatonin for sleep. Reports that both medications are helpful. There have been no recent behavioral concerns reported. Pt denies any acute concerns today. He has not had any recent hospitalizations.          Current Outpatient Medications:   •  Colace 100 mg capsule, Take 1 capsule (100 mg) by mouth 2 times a day., Disp: , Rfl:   •  EPINEPHrine 0.3 mg/0.3 mL injection syringe, Inject 0.3 mL (0.3 mg) into the muscle 1 time if needed for anaphylaxis. As Directed, Disp: , Rfl:   •  FLUoxetine (PROzac) 20 mg capsule, Take 3 capsules (60 mg) by mouth once daily., Disp: , Rfl:   •  ibuprofen 600 mg tablet, Take 1 tablet (600 mg) by mouth every 6 hours if needed for mild pain (1 - 3)., Disp: 120 tablet, Rfl: 1  •  magnesium glycinate 100 mg magnesium capsule, Take 1 capsule (100 mg) by mouth once daily., Disp: , Rfl:   •  melatonin 1 mg tablet, Take 1 tablet (1 mg) by mouth once daily at bedtime., Disp: , Rfl:   •  omeprazole (PriLOSEC) 40 mg DR capsule, Take 1 capsule (40 mg) by mouth once daily in the morning. Take before meals. Do not crush or chew., Disp: 30 capsule, Rfl: 11  •  traZODone (Desyrel) 100 mg tablet, Take 1 tablet (100 mg) by mouth once daily at bedtime., Disp: , Rfl:   •  Vitamin C 500 mg tablet, Take 2 tablets (1,000 mg) by mouth once daily., Disp: , Rfl:   •  Vitamin D3 10 mcg (400 unit) tablet, Take 2 tablets (20 mcg) by mouth once daily., Disp: , Rfl:   •  zonisamide (Zonegran) 100 mg capsule, Take 3 capsules (300 mg) by mouth twice a day., Disp: , Rfl:   •  ZyrTEC 10 mg tablet, Take 1 tablet (10 mg) by mouth once daily., Disp: , Rfl:      Review of Systems  Constitutional: Negative for appetite change, chills, fatigue, fever and unexpected weight change.   HENT: Negative for congestion, hearing loss, sore throat  and trouble swallowing.    Eyes:  Negative for visual disturbance.   Respiratory:  Negative for cough, chest tightness, shortness of breath and wheezing.    Cardiovascular:  Negative for chest pain, palpitations and leg swelling.   Gastrointestinal: Negative for abdominal pain, constipation, diarrhea, nausea and vomiting.   Genitourinary:  Negative for difficulty urinating.   Musculoskeletal:  Negative for arthralgias and gait problem  Neurological:  Negative for dizziness, tremors and light-headedness.        Occasional migraines, had 1 last week, has since resolved   Hematological:  Does not bruise/bleed easily.   Psychiatric/Behavioral:  Positive for dysphoric mood and sleep disturbance.      Objective  /84 (BP Location: Left arm, Patient Position: Standing, BP Cuff Size: Large adult)   Pulse 83   Temp 36.3 °C (97.3 °F) (Temporal)   Resp 16   Ht 1.829 m (6')   Wt 136 kg (300 lb)   SpO2 97%   BMI 40.69 kg/m²     Physical Exam  Constitutional:       General: He is not in acute distress.     Appearance: Alert. He is obese. Standing in kitchen.   HENT:      Head: Normocephalic and atraumatic.      Nose: Clear rhinorrhea     Mouth/Throat:      Mouth: Mucous membranes are moist.      Pharynx: Oropharynx is clear.   Eyes:      Pupils: Pupils are equal, round, and reactive to light.   Cardiovascular:      Rate and Rhythm: Normal rate and regular rhythm.      Heart sounds: No murmur heard.     No friction rub. No gallop.   Pulmonary:      Effort: Pulmonary effort is normal. No respiratory distress.      Breath sounds: Normal breath sounds. No wheezing, rhonchi or rales.   Abdominal:      General: Bowel sounds are normal. There is no distension.      Palpations: Abdomen is soft.      Tenderness: There is no abdominal tenderness.   Musculoskeletal:         General: Normal range of motion.      Cervical back: Neck supple.      Right lower leg: No edema.      Left lower leg: No edema.   Skin:     General: Skin is  "warm and dry.   Neurological:      General: No focal deficit present.      Mental Status: He is alert and oriented to person, place, and time.   Psychiatric:         Mood and Affect: Mood normal. Flat affect.      Behavior: Behavior normal.     Lab Results   Component Value Date    WBC 5.5 05/13/2025    HGB 16.6 05/13/2025    HCT 51.1 (H) 05/13/2025    MCV 94.5 05/13/2025     05/13/2025       Chemistry    Lab Results   Component Value Date/Time     05/13/2025 1039    K 4.2 05/13/2025 1039     05/13/2025 1039    CO2 25 05/13/2025 1039    BUN 14 05/13/2025 1039    CREATININE 1.13 05/13/2025 1039    Lab Results   Component Value Date/Time    CALCIUM 9.3 05/13/2025 1039    ALKPHOS 84 05/13/2025 1039    AST 23 05/13/2025 1039    ALT 34 05/13/2025 1039    BILITOT 1.1 05/13/2025 1039        Lab Results   Component Value Date    CHOL 174 05/13/2025    CHOL 156 05/22/2024    CHOL 131 02/29/2020     Lab Results   Component Value Date    HDL 43 05/13/2025    HDL 38.0 (L) 05/22/2024    HDL 37.8 (A) 02/29/2020     Lab Results   Component Value Date    LDLCALC 107 (H) 05/13/2025    LDLCALC 92 05/22/2024     Lab Results   Component Value Date    TRIG 129 05/13/2025    TRIG 132 05/22/2024    TRIG 89 02/29/2020     No components found for: \"CHOLHDL\"     5/13/25- Vitamin D level- 29    Assessment/Plan  Diagnoses and all orders for this visit:  Migraine without aura and without status migrainosus, not intractable  -     ibuprofen 600 mg tablet; Take 1 tablet (600 mg) by mouth every 6 hours if needed for mild pain (1 - 3).    Autism ([facility]-H[facility])  -chronic, resides in group home, requires facility supervision, -continue day program Monday-Friday     Nonintractable epilepsy without status epilepticus, unspecified epilepsy type (Multi)  -chronic, stable, no recent seizure activity noted   -continue Zonisamide   -follow up with neurology annually    Mixed hyperlipidemia  -chronic, last lipid panel with elevated LDL of 107, " goal <100  -discussed healthy diet habits   -will add statin if LDL remains high  -encouraged increased physical activity     Gastroesophageal reflux disease without esophagitis  -chronic, stable, no current GI concerns   -continue Omeprazole     Vitamin D deficiency  -chronic, last vitamin D level 29  -continue cholecalciferol/vitamin c supplement as ordered     Insomnia due to other mental disorder  -chronic, follows with [facility]   -continue trazodone, melatonin, magnesium glycinate        ROMI Daigle     Electronically Signed By: ROMI Daigle   6/22/25  4:17 PM

## 2025-08-04 ENCOUNTER — TELEPHONE (OUTPATIENT)
Dept: PRIMARY CARE | Facility: CLINIC | Age: 30
End: 2025-08-04
Payer: MEDICARE

## 2025-08-05 ENCOUNTER — NURSING HOME VISIT (OUTPATIENT)
Dept: POST ACUTE CARE | Facility: EXTERNAL LOCATION | Age: 30
End: 2025-08-05
Payer: MEDICARE

## 2025-08-05 VITALS
RESPIRATION RATE: 18 BRPM | OXYGEN SATURATION: 99 % | HEART RATE: 82 BPM | HEIGHT: 72 IN | DIASTOLIC BLOOD PRESSURE: 64 MMHG | WEIGHT: 300 LBS | TEMPERATURE: 97.7 F | BODY MASS INDEX: 40.63 KG/M2 | SYSTOLIC BLOOD PRESSURE: 116 MMHG

## 2025-08-05 DIAGNOSIS — G40.909 NONINTRACTABLE EPILEPSY WITHOUT STATUS EPILEPTICUS, UNSPECIFIED EPILEPSY TYPE (MULTI): Primary | ICD-10-CM

## 2025-08-05 DIAGNOSIS — E78.2 MIXED HYPERLIPIDEMIA: ICD-10-CM

## 2025-08-05 DIAGNOSIS — K21.9 GASTROESOPHAGEAL REFLUX DISEASE WITHOUT ESOPHAGITIS: ICD-10-CM

## 2025-08-05 DIAGNOSIS — F84.0 AUTISM (HHS-HCC): ICD-10-CM

## 2025-08-05 DIAGNOSIS — G43.009 MIGRAINE WITHOUT AURA AND WITHOUT STATUS MIGRAINOSUS, NOT INTRACTABLE: ICD-10-CM

## 2025-08-05 DIAGNOSIS — F41.1 GENERALIZED ANXIETY DISORDER: ICD-10-CM

## 2025-08-05 RX ORDER — TRAZODONE HYDROCHLORIDE 50 MG/1
50 TABLET ORAL NIGHTLY
COMMUNITY
Start: 2025-07-09

## 2025-08-05 ASSESSMENT — PAIN SCALES - GENERAL: PAINLEVEL_OUTOF10: 0-NO PAIN

## 2025-08-05 NOTE — LETTER
"Patient: Daniel Laughlin  : 1995    Encounter Date: 2025    Subjective   Patient ID: Daniel Laughlin is a 29 y.o. male who presents for Follow-up (Routine follow up, chronic medical conditions ).    Visit for 28 y/o male seen today at [facility], accompanied by  Elisabeth for routine follow up of chronic medical conditions.     PMHx of Autism, migraines, Epilepsy, GERD, Hyperlipidemia, Vitamin D deficiency, seasonal allergies, decreased hearing of left ear, constipation, hallucinations, insomnia.     Pt is standing in kitchen this morning for exam. He is alert, able to answer most questions regarding his health but is slow to respond at times and will often stutter or respond with one or two word responses. He gets easily anxious, starts swaying his arms and snapping his fingers. The facility caregiver will supplement the health history as needed. Pt resides in a group home. He requires supervision and assistance with his medications and with meal preparation. Pt is able to do his own dressing, toileting and showering independently. He is ambulatory without assistive device. Denies recent fall or injury. Pt goes to his day program Monday through Friday. He reports that he likes to go to work and is planning to go bowling on Friday. Pt is excited about this. He denies appetite changes, signs of weight loss, abdominal pain. Denies any bowel or bladder concerns. Facility staff reports concern that patient is having more \"headaches\". Pt tells me that he does not feel nauseas or dizzy when having the headaches. He has not had any vomiting or vision changes. He states \"I mostly notice the headaches when I don't eat or drink properly\". He is unable to elaborate what this specifically means. Pt has history of Epilepsy. He continues on Zonisamide. He has not had any recent seizure activity. Not currently following with a neurologist. Pt has autism, anxiety, depression. " He follows with Lea Medina through [facility] and was seen in facility this morning. No medication changes made. Pt continues to report difficulty sleeping although facility reports that he seems to sleep well without issue. There have been no recent behavioral concerns reported. Pt denies any acute concerns today. He has not had any recent hospitalizations.         Current Outpatient Medications:   •  traZODone (Desyrel) 50 mg tablet, Take 1 tablet (50 mg) by mouth once daily at bedtime., Disp: , Rfl:   •  Colace 100 mg capsule, Take 1 capsule (100 mg) by mouth 2 times a day., Disp: , Rfl:   •  EPINEPHrine 0.3 mg/0.3 mL injection syringe, Inject 0.3 mL (0.3 mg) into the muscle 1 time if needed for anaphylaxis. As Directed, Disp: , Rfl:   •  FLUoxetine (PROzac) 20 mg capsule, Take 3 capsules (60 mg) by mouth once daily., Disp: , Rfl:   •  ibuprofen 600 mg tablet, Take 1 tablet (600 mg) by mouth every 6 hours if needed for mild pain (1 - 3)., Disp: 120 tablet, Rfl: 1  •  melatonin 1 mg tablet, Take 1 tablet (1 mg) by mouth once daily at bedtime., Disp: , Rfl:   •  omeprazole (PriLOSEC) 40 mg DR capsule, Take 1 capsule (40 mg) by mouth once daily in the morning. Take before meals. Do not crush or chew., Disp: 30 capsule, Rfl: 11  •  Vitamin C 500 mg tablet, Take 2 tablets (1,000 mg) by mouth once daily., Disp: , Rfl:   •  Vitamin D3 10 mcg (400 unit) tablet, Take 2 tablets (20 mcg) by mouth once daily., Disp: , Rfl:   •  zonisamide (Zonegran) 100 mg capsule, Take 3 capsules (300 mg) by mouth twice a day., Disp: , Rfl:   •  ZyrTEC 10 mg tablet, Take 1 tablet (10 mg) by mouth once daily., Disp: , Rfl:      Review of Systems  Constitutional: Negative for appetite change, chills, fatigue, fever and unexpected weight change.   HENT: Negative for congestion, hearing loss, sore throat and trouble swallowing.    Eyes:  Negative for visual disturbance.   Respiratory:  Negative for cough, chest tightness, shortness of  breath and wheezing.    Cardiovascular:  Negative for chest pain, palpitations and leg swelling.   Gastrointestinal: Negative for abdominal pain, constipation, diarrhea, nausea and vomiting.   Genitourinary:  Negative for difficulty urinating.   Musculoskeletal:  Negative for arthralgias and gait problem  Neurological: Positive for headaches. Negative for dizziness, tremors and light-headedness.   Hematological:  Does not bruise/bleed easily.   Psychiatric/Behavioral:  Positive for dysphoric mood and sleep disturbance.      Objective   /64 (BP Location: Right arm, Patient Position: Sitting, BP Cuff Size: Adult)   Pulse 82   Temp 36.5 °C (97.7 °F) (Temporal)   Resp 18   Ht 1.829 m (6')   Wt 136 kg (300 lb)   SpO2 99%   BMI 40.69 kg/m²     Physical Exam  Constitutional:       General: He is not in acute distress.     Appearance: Alert. He is obese. Standing in kitchen.   HENT:      Head: Normocephalic and atraumatic.      Nose: Clear rhinorrhea     Mouth/Throat:      Mouth: Mucous membranes are moist.      Pharynx: Oropharynx is clear.   Eyes:      Pupils: Pupils are equal, round, and reactive to light.   Cardiovascular:      Rate and Rhythm: Normal rate and regular rhythm.      Heart sounds: No murmur heard.     No friction rub. No gallop.   Pulmonary:      Effort: Pulmonary effort is normal. No respiratory distress.      Breath sounds: Normal breath sounds. No wheezing, rhonchi or rales.   Abdominal:      General: Bowel sounds are normal. There is no distension.      Palpations: Abdomen is soft.      Tenderness: There is no abdominal tenderness.   Musculoskeletal:         General: Normal range of motion.      Cervical back: Neck supple.      Right lower leg: No edema.      Left lower leg: No edema.   Skin:     General: Skin is warm and dry.   Neurological:      General: No focal deficit present.      Mental Status: He is alert and oriented to person, place, and time.   Psychiatric:         Mood and  Affect: Mood normal. Flat affect.      Behavior: Behavior normal.      Assessment/Plan   Diagnoses and all orders for this visit:  Nonintractable epilepsy without status epilepticus, unspecified epilepsy type (Multi)  -     Referral to Neurology; Future  -chronic, stable, no recent seizure activity reported   -continue Zonisamide 300 mg BID   -facility to schedule neurology appointment     Migraine without aura and without status migrainosus, not intractable  -     Referral to Neurology; Future  -intermittent headaches, increasing in frequency   -recommend neurology consult- referral placed in EPIC    Autism ([facility])  -chronic, resides in group home, requires facility supervision, -continue day program Monday-Friday     Mixed hyperlipidemia  -chronic, goal of LDL <100   -encouraged healthy diet habits, increased physical activity     Gastroesophageal reflux disease without esophagitis  -chronic, stable, no current GI concerns   -continue Omeprazole 40 mg daily     Generalized anxiety disorder  -chronic, follows with [facility]- seen in facility this morning   -continue Fluoxetine 60 mg daily, trazodone 50 mg at HS, Melatonin 1 mg at HS    Patient stable. Recommend establishing care with Neurology. Contact information for [facility] given to  Elisabeth. She should call and schedule an appointment for patient.     Will continue to monitor labs Q6M. Due November 2025.     Advised facility staff to contact house calls office with any acute concerns or medication needs.       ROMI Daigle     Electronically Signed By: ROMI Daigle   8/5/25 12:08 PM

## 2025-08-05 NOTE — Clinical Note
I sent referral to [facility] [place]- provider Ruby Leonard CNP. Can you make sure they received this. Thank you.

## 2025-08-05 NOTE — PROGRESS NOTES
"Subjective   Patient ID: Daniel Laughlin is a 29 y.o. male who presents for Follow-up (Routine follow up, chronic medical conditions ).    Visit for 28 y/o male seen today at [facility], accompanied by  Elisabeth for routine follow up of chronic medical conditions.     PMHx of Autism, migraines, Epilepsy, GERD, Hyperlipidemia, Vitamin D deficiency, seasonal allergies, decreased hearing of left ear, constipation, hallucinations, insomnia.     Pt is standing in kitchen this morning for exam. He is alert, able to answer most questions regarding his health but is slow to respond at times and will often stutter or respond with one or two word responses. He gets easily anxious, starts swaying his arms and snapping his fingers. The facility caregiver will supplement the health history as needed. Pt resides in a group home. He requires supervision and assistance with his medications and with meal preparation. Pt is able to do his own dressing, toileting and showering independently. He is ambulatory without assistive device. Denies recent fall or injury. Pt goes to his day program Monday through Friday. He reports that he likes to go to work and is planning to go bowling on Friday. Pt is excited about this. He denies appetite changes, signs of weight loss, abdominal pain. Denies any bowel or bladder concerns. Facility staff reports concern that patient is having more \"headaches\". Pt tells me that he does not feel nauseas or dizzy when having the headaches. He has not had any vomiting or vision changes. He states \"I mostly notice the headaches when I don't eat or drink properly\". He is unable to elaborate what this specifically means. Pt has history of Epilepsy. He continues on Zonisamide. He has not had any recent seizure activity. Not currently following with a neurologist. Pt has autism, anxiety, depression. He follows with Lea Medina through [facility] and was seen in " facility this morning. No medication changes made. Pt continues to report difficulty sleeping although facility reports that he seems to sleep well without issue. There have been no recent behavioral concerns reported. Pt denies any acute concerns today. He has not had any recent hospitalizations.         Current Outpatient Medications:     traZODone (Desyrel) 50 mg tablet, Take 1 tablet (50 mg) by mouth once daily at bedtime., Disp: , Rfl:     Colace 100 mg capsule, Take 1 capsule (100 mg) by mouth 2 times a day., Disp: , Rfl:     EPINEPHrine 0.3 mg/0.3 mL injection syringe, Inject 0.3 mL (0.3 mg) into the muscle 1 time if needed for anaphylaxis. As Directed, Disp: , Rfl:     FLUoxetine (PROzac) 20 mg capsule, Take 3 capsules (60 mg) by mouth once daily., Disp: , Rfl:     ibuprofen 600 mg tablet, Take 1 tablet (600 mg) by mouth every 6 hours if needed for mild pain (1 - 3)., Disp: 120 tablet, Rfl: 1    melatonin 1 mg tablet, Take 1 tablet (1 mg) by mouth once daily at bedtime., Disp: , Rfl:     omeprazole (PriLOSEC) 40 mg DR capsule, Take 1 capsule (40 mg) by mouth once daily in the morning. Take before meals. Do not crush or chew., Disp: 30 capsule, Rfl: 11    Vitamin C 500 mg tablet, Take 2 tablets (1,000 mg) by mouth once daily., Disp: , Rfl:     Vitamin D3 10 mcg (400 unit) tablet, Take 2 tablets (20 mcg) by mouth once daily., Disp: , Rfl:     zonisamide (Zonegran) 100 mg capsule, Take 3 capsules (300 mg) by mouth twice a day., Disp: , Rfl:     ZyrTEC 10 mg tablet, Take 1 tablet (10 mg) by mouth once daily., Disp: , Rfl:      Review of Systems  Constitutional: Negative for appetite change, chills, fatigue, fever and unexpected weight change.   HENT: Negative for congestion, hearing loss, sore throat and trouble swallowing.    Eyes:  Negative for visual disturbance.   Respiratory:  Negative for cough, chest tightness, shortness of breath and wheezing.    Cardiovascular:  Negative for chest pain, palpitations and  leg swelling.   Gastrointestinal: Negative for abdominal pain, constipation, diarrhea, nausea and vomiting.   Genitourinary:  Negative for difficulty urinating.   Musculoskeletal:  Negative for arthralgias and gait problem  Neurological: Positive for headaches. Negative for dizziness, tremors and light-headedness.   Hematological:  Does not bruise/bleed easily.   Psychiatric/Behavioral:  Positive for dysphoric mood and sleep disturbance.      Objective   /64 (BP Location: Right arm, Patient Position: Sitting, BP Cuff Size: Adult)   Pulse 82   Temp 36.5 °C (97.7 °F) (Temporal)   Resp 18   Ht 1.829 m (6')   Wt 136 kg (300 lb)   SpO2 99%   BMI 40.69 kg/m²     Physical Exam  Constitutional:       General: He is not in acute distress.     Appearance: Alert. He is obese. Standing in kitchen.   HENT:      Head: Normocephalic and atraumatic.      Nose: Clear rhinorrhea     Mouth/Throat:      Mouth: Mucous membranes are moist.      Pharynx: Oropharynx is clear.   Eyes:      Pupils: Pupils are equal, round, and reactive to light.   Cardiovascular:      Rate and Rhythm: Normal rate and regular rhythm.      Heart sounds: No murmur heard.     No friction rub. No gallop.   Pulmonary:      Effort: Pulmonary effort is normal. No respiratory distress.      Breath sounds: Normal breath sounds. No wheezing, rhonchi or rales.   Abdominal:      General: Bowel sounds are normal. There is no distension.      Palpations: Abdomen is soft.      Tenderness: There is no abdominal tenderness.   Musculoskeletal:         General: Normal range of motion.      Cervical back: Neck supple.      Right lower leg: No edema.      Left lower leg: No edema.   Skin:     General: Skin is warm and dry.   Neurological:      General: No focal deficit present.      Mental Status: He is alert and oriented to person, place, and time.   Psychiatric:         Mood and Affect: Mood normal. Flat affect.      Behavior: Behavior normal.      Assessment/Plan    Diagnoses and all orders for this visit:  Nonintractable epilepsy without status epilepticus, unspecified epilepsy type (Multi)  -     Referral to Neurology; Future  -chronic, stable, no recent seizure activity reported   -continue Zonisamide 300 mg BID   -facility to schedule neurology appointment     Migraine without aura and without status migrainosus, not intractable  -     Referral to Neurology; Future  -intermittent headaches, increasing in frequency   -recommend neurology consult- referral placed in EPIC    Autism ([facility])  -chronic, resides in group home, requires facility supervision, -continue day program Monday-Friday     Mixed hyperlipidemia  -chronic, goal of LDL <100   -encouraged healthy diet habits, increased physical activity     Gastroesophageal reflux disease without esophagitis  -chronic, stable, no current GI concerns   -continue Omeprazole 40 mg daily     Generalized anxiety disorder  -chronic, follows with [facility]- seen in facility this morning   -continue Fluoxetine 60 mg daily, trazodone 50 mg at HS, Melatonin 1 mg at HS    Patient stable. Recommend establishing care with Neurology. Contact information for [facility] given to  Elisabeth. She should call and schedule an appointment for patient.     Will continue to monitor labs Q6M. Due November 2025.     Advised facility staff to contact house calls office with any acute concerns or medication needs.       Adenike Abad, APRN-CNP